# Patient Record
Sex: FEMALE | Race: WHITE | NOT HISPANIC OR LATINO | Employment: OTHER | URBAN - METROPOLITAN AREA
[De-identification: names, ages, dates, MRNs, and addresses within clinical notes are randomized per-mention and may not be internally consistent; named-entity substitution may affect disease eponyms.]

---

## 2017-09-20 ENCOUNTER — TELEPHONE (OUTPATIENT)
Dept: NEUROLOGY | Facility: CLINIC | Age: 57
End: 2017-09-20

## 2017-09-20 ENCOUNTER — TELEPHONE (OUTPATIENT)
Dept: SPINE | Facility: CLINIC | Age: 57
End: 2017-09-20

## 2017-09-20 ENCOUNTER — HOSPITAL ENCOUNTER (OUTPATIENT)
Dept: RADIOLOGY | Facility: HOSPITAL | Age: 57
Discharge: HOME | End: 2017-09-20
Attending: ORTHOPAEDIC SURGERY
Payer: MEDICARE

## 2017-09-20 ENCOUNTER — OFFICE VISIT (OUTPATIENT)
Dept: ORTHOPEDICS | Facility: CLINIC | Age: 57
End: 2017-09-20
Payer: MEDICARE

## 2017-09-20 DIAGNOSIS — M19.039 ARTHRITIS, WRIST: ICD-10-CM

## 2017-09-20 DIAGNOSIS — R20.2 NUMBNESS AND TINGLING: ICD-10-CM

## 2017-09-20 DIAGNOSIS — R20.2 NUMBNESS AND TINGLING: Primary | ICD-10-CM

## 2017-09-20 DIAGNOSIS — M54.9 BACK PAIN, UNSPECIFIED BACK LOCATION, UNSPECIFIED BACK PAIN LATERALITY, UNSPECIFIED CHRONICITY: Primary | ICD-10-CM

## 2017-09-20 DIAGNOSIS — M54.9 BACK PAIN, UNSPECIFIED BACK LOCATION, UNSPECIFIED BACK PAIN LATERALITY, UNSPECIFIED CHRONICITY: ICD-10-CM

## 2017-09-20 DIAGNOSIS — M54.2 NECK PAIN: ICD-10-CM

## 2017-09-20 DIAGNOSIS — R20.0 NUMBNESS AND TINGLING: Primary | ICD-10-CM

## 2017-09-20 DIAGNOSIS — R20.0 NUMBNESS AND TINGLING: ICD-10-CM

## 2017-09-20 PROCEDURE — 99203 OFFICE O/P NEW LOW 30 MIN: CPT | Mod: S$GLB,,, | Performed by: PHYSICIAN ASSISTANT

## 2017-09-20 PROCEDURE — 73110 X-RAY EXAM OF WRIST: CPT | Mod: TC,RT

## 2017-09-20 PROCEDURE — 73110 X-RAY EXAM OF WRIST: CPT | Mod: 26,RT,, | Performed by: RADIOLOGY

## 2017-09-20 PROCEDURE — 99999 PR PBB SHADOW E&M-EST. PATIENT-LVL III: CPT | Mod: PBBFAC,,, | Performed by: PHYSICIAN ASSISTANT

## 2017-09-20 NOTE — PROGRESS NOTES
"Subjective:      Patient ID: Alayna Anderson is a 56 y.o. female.    Chief Complaint: Neck Pain (Lower Back)      HPI     History of ETOH abuse and cocaine use, bipolar, COPD, and seizure disorder. She has not had a drink in 4 months and states she is not using any other drugs.     Had a fall in [facility] in August with pain in her neck and back since that time.     She complains of intermittent neck pain with right arm pain to her hand. No left arm pain. Neck pain < right arm pain. She is right hand dominant. No known alleviating or aggravating factors. Hard to use cane in right arm due to pain. She rates her pain as a 9 on a scale of 1-10. She has numbness, tingling, and weakness in her arm. Pain is sharp and stabbing.     She also has constant LBP with left posterior leg pain to her ankle. No right leg pain. LBP > left leg pain. No known aggravating or alleviating factors. No numbness or tingling in her legs. She has weakness in left leg. She rates her pain as a 9 on a scale of 1-10. Pain is sharp and shooting in nature.     No PT, injections, or surgery on her neck/back. She has taken some mobic with no relief.     Patient denies fevers, chills, night sweats, nausea, vomiting, and weight loss. Patient also denies bowel/bladder dysfunction, sexual dysfunction, and any saddle anesthesia. She denies balance issues, changes in handwriting, problems with hand dexterity, and frequent dropping of items. She admits to being "clammy."      Review of Systems   Eyes: Positive for blurred vision.        Positive for poor vision.    Respiratory: Negative for cough and shortness of breath.    Skin: Negative for rash.   Musculoskeletal: Positive for back pain and neck pain.   Gastrointestinal: Negative for bowel incontinence, constipation, diarrhea, nausea and vomiting.   Genitourinary: Negative for bladder incontinence.   Neurological: Positive for dizziness, numbness and paresthesias.   Psychiatric/Behavioral: The patient is not " nervous/anxious.            Objective:        General: Alayna is well-developed, well-nourished, appears stated age, in no acute distress, alert and oriented to time, place and person.     General    Vitals reviewed.  Constitutional: She is oriented to person, place, and time. She appears well-developed and well-nourished.   Pulmonary/Chest: Effort normal.   Abdominal: She exhibits no distension.   Neurological: She is alert and oriented to person, place, and time.   Psychiatric: She has a normal mood and affect. Her behavior is normal. Judgment and thought content normal.           Gait: she uses a cane to ambulate. Heel/toe/tandem walking not tested- she has poor vision and is fall risk. She sways with romberg.     On exam of the cervical spine, pt has diffuse posterior cervical tenderness.     Skin in cervical region is warm to the touch without visible rashes.     Patient has limited ROM of cervical spine in all planes due to pain.     Strength Testing of Bilateral UEs shows  Right :  +4/5   Left :  +5/5  Right deltoid:  +4/5   Left deltoid:  +5/5  Right biceps:  +4/5   Left biceps:  +5/5  Right triceps:  +4/5   Left triceps:  +5/5  Right wrist extension:  +4/5  Left wrist extension:  +5/5  Right wrist flexion:  +4/5  Left wrist flexion:  +5/5  Right interosseus:  +4/5  Left interosseus:  +5/5    Right UE weakness diffuse and appears to be pain mediated.     Sensation is grossly intact to light touch in C5, C6, C7, C8, T1 distribution.     Right shoulder has mild pain with IR/ER. Good ROM of shoulder and no tenderness.   Left shoulder has no pain with IR/ER. Good ROM of shoulder and no tenderness.     negative clonus in bilateral LEs.    negative hoffmans bilateral UEs.    DTRs:  Right biceps:  +2     Left biceps:  +2   Right brachioradialis:  +2  Left brachioradialis:  +2    On exam of the lumbar spine, Inspection of back is normal, diffuse lower lumbar tenderness.     Skin in the lumbar region is  warm to the touch without visible rashes.     muscle tone normal without spasm, limited range of motion with pain  Pain in flexion. and Pain in extension.    Strength testing of the bilateral LEs shows  Right hip abduction:  +5/5  Left hip abduction:  +5/5  Right hip flexion:  +5/5   Left hip flexion:  +5/5  Right hip extensors:  +5/5  Left hip extensors:  +5/5  Right quadriceps:  +5/5  Left quadriceps:  +5/5  Right hamstring:  +5/5  Left hamstring:  +5/5  Right dorsiflexion:  +5/5  Left dorsiflexion:  +5/5  Right plantar flexion:  +5/5  Left plantar flexion:  +5/5   Right EHL:  +5/5   Left EHL:  +5/5    negative straight leg raise on bilateral LEs.     DTRs:  Right patellar:  +2     Left patellar:  +2  Right achilles:  +2   Left achilles:  +2    Sensation is grossly intact in L2, L3, L4, L5, and S1 distribution.    Right hip has no pain with IR/ER. Left hip has no pain with IR/ER.        XRAY INTERPRETATION:  X-rays of cervical spine (AP/lat/flex/ext) dated 9/21/17 are personally reviewed and show mild DDD/spondylosis C4-T1 with slip C7-T1.    X-rays of lumbar spine (AP/lat/flex/ext) dated 9/21/17 are personally reviewed and show retrolisthesis L3-L4, mild diffuse DDD/spondylosis.         Assessment:       1. Cervical spondylosis without myelopathy    2. Degeneration of cervical intervertebral disc    3. Brachial neuritis    4. Neck pain    5. Acquired spondylolisthesis    6. Spondylosis without myelopathy    7. DDD (degenerative disc disease), lumbosacral    8. Thoracic and lumbosacral neuritis    9. Bilateral low back pain with left-sided sciatica, unspecified chronicity           Plan:       Orders Placed This Encounter    MRI Cervical Spine Without Contrast    gabapentin (NEURONTIN) 300 MG capsule       Had a fall in [employer] in August with pain in her neck and back since that time. She has intermittent neck pain with right arm pain to her hand. No left arm pain. Neck pain < right arm pain. Known mild  DDD/spondylosis C4-T1 with slip C7-T1. Also with constant LBP with left posterior leg pain to her ankle. No right leg pain. LBP > left leg pain. Known retrolisthesis L3-L4, mild diffuse DDD/spondylosis. History of ETOH abuse and cocaine use, bipolar, COPD, and seizure disorder. She has not had a drink in 4 months and states she is not using any other drugs. Treatment options reviewed with patient along with above cervical/lumbar XRs. Following plan made:     - MRI of cervical spine to further evaluate right UE radiculitis and weakness.   - Trial of neurontin to start 300mg q hs and increase to tid as tolerated. Reviewed dosing and side effects.   - Depending on results of MRI, consider cervical ESIs and/or PT for cervical/lumbar spine.   - She is not currently in litigation.     Follow-up: Return in 2 weeks (on 10/5/2017). If there are any questions prior to this, the patient was instructed to contact the office.

## 2017-09-20 NOTE — LETTER
September 22, 2017      Marlen Li MD  [address] [facility] [address]           [facility]  [address]  [facility] [facility] [address]  Phone: [phone]          Patient: Alayna Anderson   MR Number: [id number]   YOB: 1960   Date of Visit: 9/20/2017       Dear Dr. Marlen Li:    Thank you for referring Alayna Anderson to me for evaluation. Attached you will find relevant portions of my assessment and plan of care.    If you have questions, please do not hesitate to call me. I look forward to following Alayna Anderson along with you.    Sincerely,    Luh Yusuf PA-C    Enclosure  CC:  No Recipients    If you would like to receive this communication electronically, please contact [email or web address] or [phone] to request more information on [employer] Link access.    For providers and/or their staff who would like to refer a patient to [facility], please contact us through our one-stop-shop provider referral line, [facility] , at [phone].    If you feel you have received this communication in error or would no longer like to receive these types of communications, please e-mail [email or web address]

## 2017-09-21 ENCOUNTER — OFFICE VISIT (OUTPATIENT)
Dept: SPINE | Facility: CLINIC | Age: 57
End: 2017-09-21
Payer: MEDICARE

## 2017-09-21 ENCOUNTER — HOSPITAL ENCOUNTER (OUTPATIENT)
Dept: RADIOLOGY | Facility: OTHER | Age: 57
Discharge: HOME | End: 2017-09-21
Attending: PHYSICIAN ASSISTANT
Payer: MEDICARE

## 2017-09-21 VITALS
HEART RATE: 83 BPM | DIASTOLIC BLOOD PRESSURE: 58 MMHG | BODY MASS INDEX: 23.32 KG/M2 | SYSTOLIC BLOOD PRESSURE: 117 MMHG | HEIGHT: 65 IN | WEIGHT: 140 LBS

## 2017-09-21 DIAGNOSIS — M47.819 SPONDYLOSIS WITHOUT MYELOPATHY: ICD-10-CM

## 2017-09-21 DIAGNOSIS — M50.30 DEGENERATION OF CERVICAL INTERVERTEBRAL DISC: ICD-10-CM

## 2017-09-21 DIAGNOSIS — M51.37 DDD (DEGENERATIVE DISC DISEASE), LUMBOSACRAL: ICD-10-CM

## 2017-09-21 DIAGNOSIS — M54.17 THORACIC AND LUMBOSACRAL NEURITIS: ICD-10-CM

## 2017-09-21 DIAGNOSIS — M43.10 ACQUIRED SPONDYLOLISTHESIS: ICD-10-CM

## 2017-09-21 DIAGNOSIS — M54.42 BILATERAL LOW BACK PAIN WITH LEFT-SIDED SCIATICA, UNSPECIFIED CHRONICITY: ICD-10-CM

## 2017-09-21 DIAGNOSIS — M54.2 NECK PAIN: ICD-10-CM

## 2017-09-21 DIAGNOSIS — M54.12 BRACHIAL NEURITIS: ICD-10-CM

## 2017-09-21 DIAGNOSIS — M54.14 THORACIC AND LUMBOSACRAL NEURITIS: ICD-10-CM

## 2017-09-21 DIAGNOSIS — M47.812 CERVICAL SPONDYLOSIS WITHOUT MYELOPATHY: ICD-10-CM

## 2017-09-21 DIAGNOSIS — M54.9 BACK PAIN, UNSPECIFIED BACK LOCATION, UNSPECIFIED BACK PAIN LATERALITY, UNSPECIFIED CHRONICITY: ICD-10-CM

## 2017-09-21 PROCEDURE — 99999 PR PBB SHADOW E&M-EST. PATIENT-LVL III: CPT | Mod: PBBFAC,,, | Performed by: PHYSICIAN ASSISTANT

## 2017-09-21 PROCEDURE — 72120 X-RAY BEND ONLY L-S SPINE: CPT | Mod: 26,,, | Performed by: RADIOLOGY

## 2017-09-21 PROCEDURE — 72050 X-RAY EXAM NECK SPINE 4/5VWS: CPT | Mod: 26,,, | Performed by: RADIOLOGY

## 2017-09-21 PROCEDURE — 3008F BODY MASS INDEX DOCD: CPT | Mod: S$GLB,,, | Performed by: PHYSICIAN ASSISTANT

## 2017-09-21 PROCEDURE — 72050 X-RAY EXAM NECK SPINE 4/5VWS: CPT | Mod: TC

## 2017-09-21 PROCEDURE — 72100 X-RAY EXAM L-S SPINE 2/3 VWS: CPT | Mod: TC

## 2017-09-21 PROCEDURE — 99204 OFFICE O/P NEW MOD 45 MIN: CPT | Mod: S$GLB,,, | Performed by: PHYSICIAN ASSISTANT

## 2017-09-21 PROCEDURE — 72100 X-RAY EXAM L-S SPINE 2/3 VWS: CPT | Mod: 26,,, | Performed by: RADIOLOGY

## 2017-09-21 RX ORDER — GABAPENTIN 300 MG/1
CAPSULE ORAL
Qty: 90 CAPSULE | Refills: 2 | Status: SHIPPED | OUTPATIENT
Start: 2017-09-21 | End: 2017-11-27 | Stop reason: SDUPTHER

## 2017-09-21 NOTE — LETTER
September 22, 2017      Luh Yusuf PA-C  [address]  [facility] [address]           [facility]  [address], [address]  [facility] [address]  Phone: [phone]  Fax: [phone]          Patient: Alayna Anderson   MR Number: [id number]   YOB: 1960   Date of Visit: 9/21/2017       Dear Luh Yusuf:    Thank you for referring Alayna Anderson to me for evaluation. Attached you will find relevant portions of my assessment and plan of care.    If you have questions, please do not hesitate to call me. I look forward to following Alayna Anderson along with you.    Sincerely,    Alicia Arreguin PA-C    Enclosure  CC:  No Recipients    If you would like to receive this communication electronically, please contact [email or web address] or [phone] to request more information on [employer] Link access.    For providers and/or their staff who would like to refer a patient to [facility], please contact us through our one-stop-shop provider referral line, [facility], at [phone].    If you feel you have received this communication in error or would no longer like to receive these types of communications, please e-mail [email or web address]

## 2017-09-22 NOTE — PROGRESS NOTES
Subjective:      Patient ID: Alayna Anderson is a 56 y.o. female.    Chief Complaint: No chief complaint on file.    HPI    Patient is a 56 year old female who presents to clinic with  multiple complaints.  Today we will mainly focus on her right hand and wrist pain. Chapito stated that she has a history of  Prior wrist fracture a few years ago that she did not follow up for. She stated that she has constant pain in her wrist and fingers. She stated that she gets an intermittent clawing, numbness and tinlging as well as weakness. Patient does reports neck pain. She has taken Mobic without complete relief of symptoms.     Review of Systems   Constitution: Negative for chills and fever.   Cardiovascular: Negative for chest pain.   Respiratory: Negative for cough and shortness of breath.    Skin: Negative for color change, dry skin, itching, nail changes, poor wound healing and rash.   Musculoskeletal: Positive for arthritis, back pain, falls, joint pain, muscle weakness, myalgias, neck pain and stiffness.   Neurological: Negative for dizziness.   Psychiatric/Behavioral: Negative for altered mental status. The patient is not nervous/anxious.    All other systems reviewed and are negative.        Objective:            General    Constitutional: She is oriented to person, place, and time. She appears well-developed and well-nourished. No distress.   HENT:   Head: Atraumatic.   Eyes: Conjunctivae are normal.   Cardiovascular: Normal rate.    Pulmonary/Chest: Effort normal.   Neurological: She is alert and oriented to person, place, and time.   Psychiatric: She has a normal mood and affect. Her behavior is normal.             Right Hand/Wrist Exam     Range of Motion     Wrist   Extension: abnormal   Flexion: abnormal   Pronation: normal   Supination: normal     Tests   Phalens Sign: negative  Tinels Sign (Medial Nerve): negative  Finkelstein: negative        Right Elbow Exam     Tests Tinel's Sign (cubital tunnel):  negative          Muscle Strength   Right Upper Extremity   Wrist Extension: 3/5/5   Wrist Flexion: 3/5/5   : 3/5/5   Pinch Mechanism: 3/5    Vascular Exam       Capillary Refill  Right Hand: normal capillary refill            RADS: Deformity of the distal radius consistent with an old healed fracture.  Mild-to-moderate DJD.  No acute fracture or dislocation.  No bone destruction identified  Assessment:       Encounter Diagnoses   Name Primary?    Numbness and tingling Yes    Neck pain     Back pain, unspecified back location, unspecified back pain laterality, unspecified chronicity     Arthritis, wrist           Plan:       Discussed plan/ options with patient. Discussed that her symptoms may be mainly coming form her neck.   Order for EMG placed, pending EMG results she will follow up neurology or the hand clinic.   Wrist brace given. Will remove daily for range of motion.   Appointment made with back and spine.   She is to return to clinic as needed.

## 2017-09-26 ENCOUNTER — PROCEDURE VISIT (OUTPATIENT)
Dept: NEUROLOGY | Facility: CLINIC | Age: 57
End: 2017-09-26
Payer: MEDICARE

## 2017-09-26 DIAGNOSIS — R20.2 NUMBNESS AND TINGLING: ICD-10-CM

## 2017-09-26 DIAGNOSIS — M54.9 BACK PAIN, UNSPECIFIED BACK LOCATION, UNSPECIFIED BACK PAIN LATERALITY, UNSPECIFIED CHRONICITY: ICD-10-CM

## 2017-09-26 DIAGNOSIS — R20.0 NUMBNESS AND TINGLING: ICD-10-CM

## 2017-09-26 DIAGNOSIS — M54.2 NECK PAIN: ICD-10-CM

## 2017-09-26 PROCEDURE — 95886 MUSC TEST DONE W/N TEST COMP: CPT | Mod: S$GLB,,, | Performed by: PSYCHIATRY & NEUROLOGY

## 2017-09-26 PROCEDURE — 95912 NRV CNDJ TEST 11-12 STUDIES: CPT | Mod: S$GLB,,, | Performed by: PSYCHIATRY & NEUROLOGY

## 2017-10-04 DIAGNOSIS — M25.552 LEFT HIP PAIN: ICD-10-CM

## 2017-10-04 DIAGNOSIS — M25.531 RIGHT WRIST PAIN: Primary | ICD-10-CM

## 2017-10-04 DIAGNOSIS — M54.50 LUMBAGO: ICD-10-CM

## 2017-11-08 ENCOUNTER — HOSPITAL ENCOUNTER (OUTPATIENT)
Dept: RADIOLOGY | Facility: OTHER | Age: 57
Discharge: HOME | End: 2017-11-08
Attending: PHYSICIAN ASSISTANT
Payer: MEDICARE

## 2017-11-08 DIAGNOSIS — M50.30 DEGENERATION OF CERVICAL INTERVERTEBRAL DISC: ICD-10-CM

## 2017-11-08 DIAGNOSIS — M43.10 ACQUIRED SPONDYLOLISTHESIS: ICD-10-CM

## 2017-11-08 DIAGNOSIS — M54.2 NECK PAIN: ICD-10-CM

## 2017-11-08 DIAGNOSIS — M54.12 BRACHIAL NEURITIS: ICD-10-CM

## 2017-11-08 DIAGNOSIS — M47.812 CERVICAL SPONDYLOSIS WITHOUT MYELOPATHY: ICD-10-CM

## 2017-11-08 PROCEDURE — 72141 MRI NECK SPINE W/O DYE: CPT | Mod: TC

## 2017-11-08 PROCEDURE — 72141 MRI NECK SPINE W/O DYE: CPT | Mod: 26,,, | Performed by: RADIOLOGY

## 2017-11-15 ENCOUNTER — OFFICE VISIT (OUTPATIENT)
Dept: SPINE | Facility: CLINIC | Age: 57
End: 2017-11-15
Payer: MEDICARE

## 2017-11-15 VITALS
WEIGHT: 140 LBS | HEART RATE: 90 BPM | BODY MASS INDEX: 23.32 KG/M2 | HEIGHT: 65 IN | DIASTOLIC BLOOD PRESSURE: 65 MMHG | SYSTOLIC BLOOD PRESSURE: 98 MMHG

## 2017-11-15 DIAGNOSIS — M43.10 ACQUIRED SPONDYLOLISTHESIS: ICD-10-CM

## 2017-11-15 DIAGNOSIS — M54.2 NECK PAIN: ICD-10-CM

## 2017-11-15 DIAGNOSIS — G56.01 CARPAL TUNNEL SYNDROME, RIGHT: ICD-10-CM

## 2017-11-15 DIAGNOSIS — M54.14 THORACIC AND LUMBOSACRAL NEURITIS: ICD-10-CM

## 2017-11-15 DIAGNOSIS — M54.42 BILATERAL LOW BACK PAIN WITH LEFT-SIDED SCIATICA, UNSPECIFIED CHRONICITY: ICD-10-CM

## 2017-11-15 DIAGNOSIS — M47.812 CERVICAL SPONDYLOSIS WITHOUT MYELOPATHY: Primary | ICD-10-CM

## 2017-11-15 DIAGNOSIS — M54.17 THORACIC AND LUMBOSACRAL NEURITIS: ICD-10-CM

## 2017-11-15 DIAGNOSIS — M51.37 DDD (DEGENERATIVE DISC DISEASE), LUMBOSACRAL: ICD-10-CM

## 2017-11-15 DIAGNOSIS — M48.02 FORAMINAL STENOSIS OF CERVICAL REGION: ICD-10-CM

## 2017-11-15 DIAGNOSIS — M48.02 CERVICAL STENOSIS OF SPINAL CANAL: ICD-10-CM

## 2017-11-15 DIAGNOSIS — M47.819 SPONDYLOSIS WITHOUT MYELOPATHY: ICD-10-CM

## 2017-11-15 DIAGNOSIS — M54.12 BRACHIAL NEURITIS: ICD-10-CM

## 2017-11-15 DIAGNOSIS — M50.30 DEGENERATION OF CERVICAL INTERVERTEBRAL DISC: ICD-10-CM

## 2017-11-15 PROCEDURE — 99214 OFFICE O/P EST MOD 30 MIN: CPT | Mod: S$GLB,,, | Performed by: PHYSICIAN ASSISTANT

## 2017-11-15 PROCEDURE — 99999 PR PBB SHADOW E&M-EST. PATIENT-LVL IV: CPT | Mod: PBBFAC,,, | Performed by: PHYSICIAN ASSISTANT

## 2017-11-15 RX ORDER — IBUPROFEN 800 MG/1
800 TABLET ORAL
Qty: 90 TABLET | Refills: 2 | Status: SHIPPED | OUTPATIENT
Start: 2017-11-15 | End: 2018-04-16

## 2017-11-15 NOTE — PATIENT INSTRUCTIONS
You can try increasing neurontin to 600mg three times a day,     Take 300mg in am, 300mg at lunch, and 600mg at night for 3 days, then    Take 300mg in am, 600mg at lunch, and 600mg at night for 3 days, then    Take 600mg three times a day.       Let me know if you need me to call the 600mg pills to your pharmacy.

## 2017-11-15 NOTE — PROGRESS NOTES
Subjective:      Patient ID: Alayna Anderson is a 56 y.o. female.    Chief Complaint: Follow-up (After MRI)      HPI  (Kalyvas)    History of ETOH abuse and cocaine use, bipolar, COPD, and seizure disorder. She has not had a drink in 4 months and states she is not using any other drugs.     Had a fall in [place] in [place] with pain in her neck and back since that time.     Known mild DDD/spondylosis C4-T1 with slip C7-T1 along with retrolisthesis L3-L4, mild diffuse DDD/spondylosis.     Here today to review her cervical MRI scan. Started on neurontin at her last visit. Also had recent EMG by Dr. Rahman of both UEs on 9/26/17 showing right CTS. She continues to complain of intermittent neck pain with right arm pain to her hand. She now has intermittent left arm pain into hand as well. Neck pain < right arm pain. No known alleviating or aggravating factors. Hard to use cane in right arm due to pain. She rates her pain as a 9 on a scale of 1-10. She has numbness, tingling, and weakness in her right arm. Pain is sharp and stabbing. She also continues with constant LBP with left posterior leg pain to her ankle. No right leg pain. LBP > left leg pain. No known aggravating or alleviating factors. No numbness or tingling in her legs. She has weakness in left leg. Pain is sharp and shooting in nature. She has noted some initial improvement with neurontin, but thinks she may need to go up on the dose.       Review of Systems   Constitution: Negative for chills, fever, night sweats and weight gain.   Gastrointestinal: Negative for bowel incontinence, nausea and vomiting.   Genitourinary: Negative for bladder incontinence.   Neurological: Negative for disturbances in coordination and loss of balance.           Objective:        General: Alayna is well-developed, well-nourished, appears stated age, in no acute distress, alert and oriented to time, place and person.     Ortho/SPM Exam     Patient sits comfortably in the exam  room and answers questions appropriately. Grossly patient is able to move bilateral UEs/LEs without difficulty. Ambulates with cane.       XRAY INTERPRETATION:  MRI of cervical spine dated 11/8/17 is personally reviewed and shows diffuse cervical spondylosis with mild right foraminal stenosis C3-C4. Moderate left foraminal stenosis C4-C5. Left paracentral disc C5-C6 with mild central stenosis and mild right/moderate left foraminal stenosis. Moderate left/mild right foraminal stenosis C6-C7. Mild central stenosis C7-T1 with slip and moderate bilateral foraminal stenosis.         Assessment:       1. Cervical spondylosis without myelopathy    2. Degeneration of cervical intervertebral disc    3. Brachial neuritis    4. Neck pain    5. Acquired spondylolisthesis    6. Spondylosis without myelopathy    7. DDD (degenerative disc disease), lumbosacral    8. Thoracic and lumbosacral neuritis    9. Bilateral low back pain with left-sided sciatica, unspecified chronicity    10. Foraminal stenosis of cervical region    11. Carpal tunnel syndrome, right    12. Cervical stenosis of spinal canal           Plan:       Orders Placed This Encounter    Ambulatory referral to Physical Therapy - Cervical    ibuprofen (ADVIL,MOTRIN) 800 MG tablet       Had a fall in [employer] in August with pain in her neck and back since that time. She has intermittent neck pain with right arm pain to her hand. Now with intermittent left arm pain as well. Neck pain < right arm pain. Known mild DDD/spondylosis C4-T1 with slip C7-T1 along with multilevel foraminal stenosis and mild central stenosis C5-C6/C7-T1. Recent EMG showed right CTS with no cervical radiculopathy. Also with constant LBP with left posterior leg pain to her ankle. No right leg pain. LBP > left leg pain. Known retrolisthesis L3-L4, mild diffuse DDD/spondylosis. History of ETOH abuse and cocaine use, bipolar, COPD, and seizure disorder. Treatment options reviewed with patient along with  above cervical MRI. Following plan made:     - Increase neurontin slowly to 600mg tid. Given instructions. Reviewed side effects.   - New prescription for motrin 800mg. Take with food.   - PT for both cervical and lumbar spine with good HEP. Internal orders sent to [facility] [address].   - Agree with f/u with ortho hand for right CTS.   - If neck/arm pain persist, consider C7-T1 IL HOSEA with pain management. If back/leg pain get worse, consider lumbar MRI.   - She is not currently in litigation.     Follow-up: Return in 3 months (on 2/15/2018). If there are any questions prior to this, the patient was instructed to contact the office.

## 2017-11-20 ENCOUNTER — TELEPHONE (OUTPATIENT)
Dept: SPINE | Facility: CLINIC | Age: 57
End: 2017-11-20

## 2017-11-20 NOTE — TELEPHONE ENCOUNTER
----- Message from Keila Houston sent at 11/20/2017  2:56 PM CST -----  Contact: Pt  Pt is calling to speak with nurse in regards to pain level in both hands, neck and spine.  Pt can be reached at [phone] to discuss.    Thank you

## 2017-11-27 ENCOUNTER — TELEPHONE (OUTPATIENT)
Dept: SPINE | Facility: CLINIC | Age: 57
End: 2017-11-27

## 2017-11-27 DIAGNOSIS — M54.17 THORACIC AND LUMBOSACRAL NEURITIS: ICD-10-CM

## 2017-11-27 DIAGNOSIS — M54.2 NECK PAIN: ICD-10-CM

## 2017-11-27 DIAGNOSIS — M47.812 CERVICAL SPONDYLOSIS WITHOUT MYELOPATHY: ICD-10-CM

## 2017-11-27 DIAGNOSIS — M50.30 DEGENERATION OF CERVICAL INTERVERTEBRAL DISC: ICD-10-CM

## 2017-11-27 DIAGNOSIS — M43.10 ACQUIRED SPONDYLOLISTHESIS: ICD-10-CM

## 2017-11-27 DIAGNOSIS — M47.819 SPONDYLOSIS WITHOUT MYELOPATHY: ICD-10-CM

## 2017-11-27 DIAGNOSIS — M54.14 THORACIC AND LUMBOSACRAL NEURITIS: ICD-10-CM

## 2017-11-27 DIAGNOSIS — M54.12 BRACHIAL NEURITIS: ICD-10-CM

## 2017-11-27 DIAGNOSIS — M51.37 DDD (DEGENERATIVE DISC DISEASE), LUMBOSACRAL: ICD-10-CM

## 2017-11-27 DIAGNOSIS — M54.42 BILATERAL LOW BACK PAIN WITH LEFT-SIDED SCIATICA, UNSPECIFIED CHRONICITY: ICD-10-CM

## 2017-11-27 NOTE — TELEPHONE ENCOUNTER
Called patient no answer left message to give office a call.  ----- Message from Alicia Arreguin PA-C sent at 11/27/2017  2:51 PM CST -----  We talked about her increasing her neurontin slowly to 600mg tid. I got a refill request for her neurontin today. Please call and find out exact dose she is taking and how often so I can fill it correctly.     Thanks.

## 2017-11-27 NOTE — TELEPHONE ENCOUNTER
----- Message from Pennie Rico sent at 11/27/2017  3:51 PM CST -----  Contact: pt   x 1st Request  _ 2nd Request  _ 3rd Request    Who: pt     Why: Pt states she is returning Sallie call. Pt states she is taking neurontin 300 mg, 3 in the morning, 3 in the afternoon and 3 at night. Please call and discuss.     What Number to Call Back: [phone]     When to Expect a call back: (Before the end of the day)  -- if call after 3:00 call back will be tomorrow.

## 2017-11-27 NOTE — TELEPHONE ENCOUNTER
Called patient no answer left message on voicemail to return office call.  ----- Message from Shu Camacho sent at 11/27/2017  1:05 PM CST -----  Contact: pt  x_  1st Request  _  2nd Request  _  3rd Request    Please refill the medication listed below. Please call the patient when the prescription is ready for  at this phone number:  [phone]     Medication #1-gabapentin (NEURONTIN) 300 MG capsule      Preferred Pharmacy:[facility] [address] [place] [id number]

## 2017-11-27 NOTE — TELEPHONE ENCOUNTER
Patient calling for RX refill of Gabapentin.  ----- Message from Ephraim Borden sent at 11/27/2017  1:29 PM CST -----  _  1st Request  _X  2nd Request  _  3rd Request        Who: DEE PORTILLO [[id number]]    Why: Pt is returning a call from Kristyn. Please call back.    What Number to Call Back:[phone]    When to Expect a call back: (Within 24 hours)    Please return the call at earliest convenience. Thanks!

## 2017-11-28 RX ORDER — GABAPENTIN 300 MG/1
CAPSULE ORAL
Qty: 270 CAPSULE | Refills: 2 | Status: SHIPPED | OUTPATIENT
Start: 2017-11-28 | End: 2018-02-28 | Stop reason: SDUPTHER

## 2017-12-01 ENCOUNTER — OFFICE VISIT (OUTPATIENT)
Dept: ORTHOPEDICS | Facility: CLINIC | Age: 57
End: 2017-12-01
Payer: MEDICARE

## 2017-12-01 ENCOUNTER — TELEPHONE (OUTPATIENT)
Dept: ORTHOPEDICS | Facility: CLINIC | Age: 57
End: 2017-12-01

## 2017-12-01 VITALS
HEART RATE: 96 BPM | HEIGHT: 65 IN | SYSTOLIC BLOOD PRESSURE: 160 MMHG | WEIGHT: 140 LBS | DIASTOLIC BLOOD PRESSURE: 82 MMHG | BODY MASS INDEX: 23.32 KG/M2

## 2017-12-01 DIAGNOSIS — M19.049 HAND ARTHRITIS: ICD-10-CM

## 2017-12-01 DIAGNOSIS — M65.4 DE QUERVAIN'S TENOSYNOVITIS, RIGHT: ICD-10-CM

## 2017-12-01 DIAGNOSIS — G56.01 CARPAL TUNNEL SYNDROME ON RIGHT: Primary | ICD-10-CM

## 2017-12-01 DIAGNOSIS — M19.039 WRIST ARTHRITIS: ICD-10-CM

## 2017-12-01 DIAGNOSIS — F17.200 SMOKER: ICD-10-CM

## 2017-12-01 PROCEDURE — 99204 OFFICE O/P NEW MOD 45 MIN: CPT | Mod: 25,S$GLB,, | Performed by: PLASTIC SURGERY

## 2017-12-01 PROCEDURE — 99999 PR PBB SHADOW E&M-EST. PATIENT-LVL III: CPT | Mod: PBBFAC,,, | Performed by: PLASTIC SURGERY

## 2017-12-01 PROCEDURE — 20550 NJX 1 TENDON SHEATH/LIGAMENT: CPT | Mod: 59,51,RT,S$GLB | Performed by: PHYSICIAN ASSISTANT

## 2017-12-01 PROCEDURE — 20605 DRAIN/INJ JOINT/BURSA W/O US: CPT | Mod: 59,51,RT,S$GLB | Performed by: PHYSICIAN ASSISTANT

## 2017-12-01 PROCEDURE — 20526 THER INJECTION CARP TUNNEL: CPT | Mod: 59,RT,S$GLB, | Performed by: PHYSICIAN ASSISTANT

## 2017-12-01 RX ORDER — TRIAMCINOLONE ACETONIDE 40 MG/ML
80 INJECTION, SUSPENSION INTRA-ARTICULAR; INTRAMUSCULAR
Status: COMPLETED | OUTPATIENT
Start: 2017-12-01 | End: 2017-12-01

## 2017-12-01 RX ORDER — LIDOCAINE HYDROCHLORIDE 10 MG/ML
2 INJECTION, SOLUTION EPIDURAL; INFILTRATION; INTRACAUDAL; PERINEURAL
Status: COMPLETED | OUTPATIENT
Start: 2017-12-01 | End: 2017-12-01

## 2017-12-01 RX ADMIN — LIDOCAINE HYDROCHLORIDE 20 MG: 10 INJECTION, SOLUTION EPIDURAL; INFILTRATION; INTRACAUDAL; PERINEURAL at 04:12

## 2017-12-01 RX ADMIN — TRIAMCINOLONE ACETONIDE 80 MG: 40 INJECTION, SUSPENSION INTRA-ARTICULAR; INTRAMUSCULAR at 04:12

## 2017-12-01 NOTE — LETTER
December 1, 2017      uLh Yusuf PA-C  [address]  [facility] [address]           [facility]  [address], [address]  [facility] [address]  Phone: [phone]          Patient: Alayna Anderson   MR Number: [id number]   YOB: 1960   Date of Visit: 12/1/2017       Dear Luh Yusuf:    Thank you for referring Alayna Anderson to me for evaluation. Attached you will find relevant portions of my assessment and plan of care.    If you have questions, please do not hesitate to call me. I look forward to following Alayna Anderson along with you.    Sincerely,    Fransisco Jin Jr., MD    Enclosure  CC:  No Recipients    If you would like to receive this communication electronically, please contact [email or web address] or [phone] to request more information on [employer] Link access.    For providers and/or their staff who would like to refer a patient to [facility], please contact us through our one-stop-shop provider referral line, [facility] , at [phone].    If you feel you have received this communication in error or would no longer like to receive these types of communications, please e-mail [email or web address]

## 2017-12-01 NOTE — TELEPHONE ENCOUNTER
----- Message from Pravin Zuluaga sent at 12/1/2017  2:21 PM CST -----  Contact: Pt  _x  1st Request  _  2nd Request  _  3rd Request        Who: DEE PORTILLO [[id number]]    Why: Requesting a call back in regards to discussing appt that is scheduled for today @ 230. Pt states she has to walk and is running late. Would like to discuss if she will be able to continue with appt.  What Number to Call Back:[phone]    When to Expect a call back: (Within 24 hours)    Please return the call at earliest convenience. Thanks!

## 2017-12-01 NOTE — PROGRESS NOTES
Subjective:      Patient ID: Alayna Anderson is a 57 y.o. female.    Chief Complaint: Pain of the Right Wrist      HPI  Alayna Anderson is a right hand dominant 57 y.o. female with hx right wrist fracture and cervical stenosis presenting today for right hand pain and numbness. She notes constant numbness of the right hand, all fingers. Also has shooting pains of the hands which radiate up the forearm. She has difficulty using her cane due to pain. Has intermittent swelling of the right wrist. She does have a night time brace she is unsure how much this helps.       Review of patient's allergies indicates:   Allergen Reactions    Opioids - morphine analogues          Current Outpatient Prescriptions   Medication Sig Dispense Refill    gabapentin (NEURONTIN) 300 MG capsule 3 po (900mg) tid 270 capsule 2    ibuprofen (ADVIL,MOTRIN) 800 MG tablet Take 1 tablet (800 mg total) by mouth 3 (three) times daily with meals. 90 tablet 2    divalproex (DEPAKOTE) 250 MG EC tablet Take 1 tablet (250 mg total) by mouth as directed. Take 1 pill (250 mg) twice a day for two days,  Then begin 1 pill (250 mg) every morning, and 2 pills (500 mg) every morning. 90 tablet 6    levetiracetam (KEPPRA) 500 MG Tab Take 1 tablet (500 mg total) by mouth 2 (two) times daily. 60 tablet 11    sertraline (ZOLOFT) 50 MG tablet Take 1 tablet (50 mg total) by mouth once daily. Take one half tablet (25 mg) daily for two days, then change to one tablet (50 mg) daily. 30 tablet 11     No current facility-administered medications for this visit.        Past Medical History:   Diagnosis Date    Alcohol abuse     Arthritis     Bipolar disorder     COPD (chronic obstructive pulmonary disease)     Disc degeneration     Diverticulitis     Homelessness     Seizures     Tobacco abuse        Past Surgical History:   Procedure Laterality Date    TUBAL LIGATION         Review of Systems:  Constitutional: Negative for chills and fever.   Respiratory:  "Negative for cough and shortness of breath.    Gastrointestinal: Negative for nausea and vomiting.   Skin: Negative for rash.   Neurological: Negative for dizziness and headaches.   Psychiatric/Behavioral: Negative for depression.   MSK as in HPI       OBJECTIVE:     PHYSICAL EXAM:  BP (!) 160/82 (BP Location: Left arm, Patient Position: Sitting, BP Method: Small (Automatic))   Pulse 96   Ht 5' 5" (1.651 m)   Wt 63.5 kg (140 lb)   BMI 23.30 kg/m²     GEN:  NAD, well-developed, well-groomed.  NEURO: Awake, alert, and oriented. Normal attention and concentration.    PSYCH: Normal mood and affect. Behavior is normal.  HEENT: No cervical lymphadenopathy noted.  CARDIOVASCULAR: Radial pulses 2+ bilaterally. No LE edema noted.  PULMONARY: Breath sounds normal. No respiratory distress.  SKIN: Intact, no rashes.      MSK:   RUE:  Good active ROM of the wrist and fingers. She has decreased sensation in the radial, ulnar, and median nerve distributions in the right hand as compared to the left. She is tender to palpation along the joint line of the wrist. She is tender to palpation of the first dorsal compartment. Positive finkelstein. Positive tinels at the wrist, pain with carpal tunnel compression test.       RADIOGRAPHS:  Xray right wrist 9/20/17  Narrative   3 views    Deformity of the distal radius consistent with an old healed fracture.  Mild-to-moderate DJD.  No acute fracture or dislocation.  No bone destruction identified     Comments: I have personally reviewed the imaging and I agree with the above radiologist's report.    EMG 9/26/17  Impression   This study is abnormal. There is electrophysiologic evidence for a right median neuropathy at the wrist (carpal tunnel syndrome).     ASSESSMENT/PLAN:       ICD-10-CM ICD-9-CM   1. Carpal tunnel syndrome on right G56.01 354.0   2. De Quervain's tenosynovitis, right M65.4 727.04   3. Hand arthritis M19.049 716.94   4. Wrist arthritis M19.039 716.93   5. Smoker " F17.200 305.1       Orders Placed This Encounter    Ambulatory referral to Smoking Cessation Program     Orders Placed This Encounter   Procedures    Ambulatory referral to Smoking Cessation Program        Plan:   -discussed treatment options with patient. She has decided to proceed with injections of the right carpal tunnel, right first dorsal compartment, and right radiocarpal joint.   -smoking cessation program referral placed per pt request  -RTC 6 wks       PROCEDURE:  I have explained the risks, benefits, and alternatives of the procedure in detail.  The patient voices understanding and all questions have been answered.  The patient agrees to proceed as planned. So after a sterile prep of the skin in the normal fashion the right carpal tunnel is injected from the volar approach using a 25 gauge needle with a combination of 0.5 cc 1% plain lidocaine and 20 mg of kenalog.  The patient is cautioned and immediate relief of pain is secondary to the local anesthetic and will be temporary.  After the anesthetic wears off there may be a increase in pain that may last for a few hours or a few days and they should use ice to help alleviate this flair up of pain. Patient tolerated the procedure well.     PROCEDURE:  I have explained the risks, benefits, and alternatives of the procedure in detail.  The patient voices understanding and all questions have been answered.  The patient agrees to proceed as planned. So after a sterile prep of the skin in the normal fashion the right radiocarpal joint is injected from the dorsal approach using a 25 gauge needle with a combination of 1cc 1% plain lidocaine and 40 mg of kenalog.  The patient is cautioned and immediate relief of pain is secondary to the local anesthetic and will be temporary.  After the anesthetic wears off there may be a increase in pain that may last for a few hours or a few days and they should use ice to help alleviate this flair up of pain. Patient  tolerated the procedure well.     PROCEDURE:  I have explained the risks, benefits, and alternatives of the procedure in detail.  The patient voices understanding and all questions have been answered.  The patient agrees to proceed as planned. So after a sterile prep of the skin in the normal fashion the right first dorsal compartment is injected using a 25 gauge needle with a combination of 0.5 cc 1% plain lidocaine and 20 mg of kenalog.  The patient is cautioned and immediate relief of pain is secondary to the local anesthetic and will be temporary.  After the anesthetic wears off there may be a increase in pain that may last for a few hours or a few days and they should use ice to help alleviate this flair up of pain. Patient tolerated the procedure well.     The patient indicates understanding of these issues and agrees to the plan.    Mary Kay Tran PA-C  [facility] [place], [place]

## 2018-01-25 ENCOUNTER — TELEPHONE (OUTPATIENT)
Dept: ORTHOPEDICS | Facility: CLINIC | Age: 58
End: 2018-01-25

## 2018-01-25 NOTE — TELEPHONE ENCOUNTER
Phone call to pt - states that appt is a follow up for rt wrist from December, 2017, but left wrist is beginning w/similar sx intermittently. [facility] I will hold Xray for now until she sees  tomorrow

## 2018-01-26 ENCOUNTER — OFFICE VISIT (OUTPATIENT)
Dept: ORTHOPEDICS | Facility: CLINIC | Age: 58
End: 2018-01-26
Payer: MEDICARE

## 2018-01-26 VITALS
HEIGHT: 65 IN | SYSTOLIC BLOOD PRESSURE: 131 MMHG | DIASTOLIC BLOOD PRESSURE: 83 MMHG | HEART RATE: 82 BPM | BODY MASS INDEX: 23.32 KG/M2 | WEIGHT: 140 LBS

## 2018-01-26 DIAGNOSIS — G56.02 LEFT CARPAL TUNNEL SYNDROME: Primary | ICD-10-CM

## 2018-01-26 PROCEDURE — 99213 OFFICE O/P EST LOW 20 MIN: CPT | Mod: 25,S$GLB,, | Performed by: PLASTIC SURGERY

## 2018-01-26 PROCEDURE — 20526 THER INJECTION CARP TUNNEL: CPT | Mod: LT,S$GLB,, | Performed by: PLASTIC SURGERY

## 2018-01-26 PROCEDURE — 99999 PR PBB SHADOW E&M-EST. PATIENT-LVL II: CPT | Mod: PBBFAC,,, | Performed by: PLASTIC SURGERY

## 2018-01-26 RX ORDER — TRIAMCINOLONE ACETONIDE 40 MG/ML
40 INJECTION, SUSPENSION INTRA-ARTICULAR; INTRAMUSCULAR
Status: COMPLETED | OUTPATIENT
Start: 2018-01-26 | End: 2018-01-26

## 2018-01-26 RX ORDER — LIDOCAINE HYDROCHLORIDE 10 MG/ML
1 INJECTION INFILTRATION; PERINEURAL
Status: COMPLETED | OUTPATIENT
Start: 2018-01-26 | End: 2018-01-26

## 2018-01-26 RX ADMIN — TRIAMCINOLONE ACETONIDE 40 MG: 40 INJECTION, SUSPENSION INTRA-ARTICULAR; INTRAMUSCULAR at 04:01

## 2018-01-26 RX ADMIN — LIDOCAINE HYDROCHLORIDE 1 ML: 10 INJECTION INFILTRATION; PERINEURAL at 04:01

## 2018-01-26 NOTE — LETTER
January 26, 2018      Luh Yusuf PA-C  [address]  [facility] [address]           [facility]  [address], [address]  [facility] [address]  Phone: [phone]          Patient: Alayna Anderson   MR Number: [id number]   YOB: 1960   Date of Visit: 1/26/2018       Dear Luh Yusuf:    Thank you for referring Alayna Anderson to me for evaluation. Attached you will find relevant portions of my assessment and plan of care.    If you have questions, please do not hesitate to call me. I look forward to following Alayna Anderson along with you.    Sincerely,    Fransisco Jin Jr., MD    Enclosure  CC:  No Recipients    If you would like to receive this communication electronically, please contact [email or web address] or [phone] to request more information on [employer] access.    For providers and/or their staff who would like to refer a patient to [facility], please contact us through our one-stop-shop provider referral line, [facility] , at [phone].    If you feel you have received this communication in error or would no longer like to receive these types of communications, please e-mail [email or web address]

## 2018-01-26 NOTE — PROGRESS NOTES
"HISTORY OF PRESENT ILLNESS:  Ms. Anderson returns today four weeks after the   injection of her right carpal tunnel, right de Quervain's and right radiocarpal   joint.  She states that she is doing well.  She states that her symptoms that   she was complaining about four weeks ago have subsided.  She occasionally has   some numbness and tingling and overall she is pleased.  She began to experience   numbness and tingling within the median distribution of the left hand.  She   denies any recent history of trauma and no other complaints today.    PHYSICAL EXAMINATION:  VITAL SIGNS: /83 (BP Location: Right arm, Patient Position: Sitting, BP Method: Medium (Automatic))   Pulse 82   Ht 5' 5" (1.651 m)   Wt 63.5 kg (140 lb)   BMI 23.30 kg/m²     GENERAL:  No acute distress, alert and oriented x3, cooperative, well nourished.  LUNGS:  Nonlabored on room air.  LEFT UPPER EXTREMITY:  Positive Tinel's over the carpal tunnel.  She has full   active range of motion of all digits at all joints.  She is neurovascularly   intact in the median, radial and ulnar distributions.    PROCEDURE:  I have explained the risks, benefits, and alternatives of the procedure in detail.  The patient voices understanding and all questions have been answered.  The patient agrees to proceed as planned. After a sterile prep of the skin the left carpal tunnel is injected from the volar approach using a 25 gauge needle with a combination of 1cc 1% plain xylocaine and 40 mg of Kenalog.  The patient is cautioned and immediate relief of pain is secondary to the local anesthetic and will be temporary.  After the anesthetic wears off there may be a increase in pain that may last for a few hours or a few days and they should use ice to help alleviate this flair up of pain.       ASSESSMENT:  Left carpal tunnel syndrome.    PLAN:  I discussed that she appears to have carpal tunnel symptoms in left hand,   which appear to be milder than the right.  Due " to the fact she is presenting   with symptoms today, I discussed performing an injection into the carpal tunnel   on the left.  She wished to receive with this.  Please see procedure note above.    She was instructed to follow with me as needed if her symptoms fail to improve   or worsen.  All questions and concerns were addressed prior to discharge.      KOBE/ELZBIETA  dd: 01/26/2018 16:33:57 (CST)  td: 01/27/2018 12:22:06 (CST)  Doc ID   #[id number]  Job ID #[id number]    CC:       Dictation Confirmation Code: [id number]  Fransisco Jin Jr. MD  01/26/2018  4:29 PM

## 2018-02-01 ENCOUNTER — TELEPHONE (OUTPATIENT)
Dept: SPINE | Facility: CLINIC | Age: 58
End: 2018-02-01

## 2018-02-01 DIAGNOSIS — M47.812 CERVICAL SPONDYLOSIS WITHOUT MYELOPATHY: ICD-10-CM

## 2018-02-01 DIAGNOSIS — M47.819 SPONDYLOSIS WITHOUT MYELOPATHY: ICD-10-CM

## 2018-02-01 DIAGNOSIS — M50.30 DEGENERATION OF CERVICAL INTERVERTEBRAL DISC: ICD-10-CM

## 2018-02-01 DIAGNOSIS — M43.10 ACQUIRED SPONDYLOLISTHESIS: ICD-10-CM

## 2018-02-01 DIAGNOSIS — M51.37 DDD (DEGENERATIVE DISC DISEASE), LUMBOSACRAL: ICD-10-CM

## 2018-02-01 DIAGNOSIS — M54.42 BILATERAL LOW BACK PAIN WITH LEFT-SIDED SCIATICA, UNSPECIFIED CHRONICITY: ICD-10-CM

## 2018-02-01 DIAGNOSIS — M48.02 CERVICAL STENOSIS OF SPINAL CANAL: ICD-10-CM

## 2018-02-01 DIAGNOSIS — M54.2 NECK PAIN: Primary | ICD-10-CM

## 2018-02-01 DIAGNOSIS — M48.02 FORAMINAL STENOSIS OF CERVICAL REGION: ICD-10-CM

## 2018-02-01 DIAGNOSIS — M54.12 BRACHIAL NEURITIS: ICD-10-CM

## 2018-02-01 RX ORDER — METHYLPREDNISOLONE 4 MG/1
TABLET ORAL
Qty: 1 PACKAGE | Refills: 0 | Status: SHIPPED | OUTPATIENT
Start: 2018-02-01 | End: 2018-02-15 | Stop reason: ALTCHOICE

## 2018-02-02 NOTE — TELEPHONE ENCOUNTER
Patient having neck pain 10/10 she stated it goes behind her neck down low back please advise   ----- Message from Pennie Rico sent at 2/1/2018  3:02 PM CST -----  Contact: pt   x 1st Request  _ 2nd Request  _ 3rd Request    Who:pt     Why:pt is requesting a call back in regards to her back pain she is having.     What Number to Call Back:[phone]     When to Expect a call back: (Before the end of the day)  -- if call after 3:00 call back will be tomorrow.         
Recommend a medrol dose pack. I have sent this to her pharmacy. She should stop motrin while she is taking this. Please make her a f/u with me in next 2 weeks as well.   
not applicable (Male)

## 2018-02-05 DIAGNOSIS — J18.9 PNEUMONIA, UNSPECIFIED ORGANISM: Primary | ICD-10-CM

## 2018-02-14 NOTE — PROGRESS NOTES
Subjective:      Patient ID: Alayna Anderson is a 57 y.o. female.    Chief Complaint: Neck Pain      HPI  (Kalyvas)    Known mild DDD/spondylosis C4-T1 with slip C7-T1 along with multilevel foraminal stenosis and mild central stenosis C5-C6/C7-T1. Recent EMG showed right CTS with no cervical radiculopathy. Also with known retrolisthesis L3-L4, mild diffuse DDD/spondylosis. History of ETOH abuse and cocaine use, bipolar, COPD, and seizure disorder. States she currently is not drinking and that last drug use was years ago.     Sent to PT at last visit, increased her neurontin, and started her on motrin. Called on 2/1/18 with severe neck pain and medrol dose pack was called in. This helped her pain significantly.     She has been sick over last month with frequent coughing. This has aggravated her neck and back pain. She also notes pain in left side of her abdomen with muscle spasms. She has intermittent neck and LBP that is worse with coughing. She has muscle spasms. She rates her pain as a 9 on a scale of 1-10. No significant arm or leg pain. She continues on neurontin and prn motrin. She was given norco by ED. She has not yet made it to PT. She needs a new prescription.       Review of Systems   Constitution: Negative for chills, fever, night sweats and weight gain.   Gastrointestinal: Negative for bowel incontinence, nausea and vomiting.   Genitourinary: Negative for bladder incontinence.   Neurological: Negative for disturbances in coordination and loss of balance.           Objective:        General: Alayna is well-developed, well-nourished, appears stated age, in no acute distress, alert and oriented to time, place and person.     Ortho/SPM Exam     Patient sits comfortably in the exam room and answers questions appropriately. Grossly patient is able to move bilateral UEs/LEs without difficulty. Ambulates with cane.     She has diffuse posterior cervical and central lower lumbar tenderness.     Strength  Testing of Bilateral UEs shows  Right :  +5/5   Left :  +5/5  Right deltoid:  +5/5   Left deltoid:  +5/5  Right biceps:  +5/5   Left biceps:  +5/5  Right triceps:  +5/5   Left triceps:  +5/5  Right wrist extension:  +5/5  Left wrist extension:  +5/5  Right wrist flexion:  +5/5  Left wrist flexion:  +5/5  Right interosseus:  +5/5  Left interosseus:  +5/5    Strength testing of the bilateral LEs shows  Right hip abduction:  +5/5  Left hip abduction:  +5/5  Right hip flexion:  +5/5   Left hip flexion:  +5/5  Right hip extensors:  +5/5  Left hip extensors:  +5/5  Right quadriceps:  +5/5  Left quadriceps:  +5/5  Right hamstring:  +5/5  Left hamstring:  +5/5  Right dorsiflexion:   +5/5  Left dorsiflexion:  +5/5  Right plantar flexion:  +5/5  Left plantar flexion:  +5/5   Right EHL:  +5/5   Left EHL:  +5/5    Sensation is grossly intact to light touch in bilateral UEs/LEs.         Assessment:       1. Neck pain    2. Cervical spondylosis without myelopathy    3. Degeneration of cervical intervertebral disc    4. Acquired spondylolisthesis    5. Spondylosis without myelopathy    6. DDD (degenerative disc disease), lumbosacral    7. Foraminal stenosis of cervical region    8. Cervical stenosis of spinal canal    9. Bilateral low back pain without sciatica, unspecified chronicity           Plan:       Orders Placed This Encounter    Ambulatory consult to [facility]    cyclobenzaprine (FLEXERIL) 10 MG tablet       She has been sick over last month with frequent coughing that has aggravated her neck/back pain. Now with intermittent neck and LBP that is worse with coughing. No significant arm or leg pain. Known mild DDD/spondylosis C4-T1 with slip C7-T1 along with multilevel foraminal stenosis and mild central stenosis C5-C6/C7-T1. Recent EMG showed right CTS with no cervical radiculopathy. Also with known retrolisthesis L3-L4, mild diffuse DDD/spondylosis. History of ETOH abuse and cocaine use, bipolar,  COPD, and seizure disorder. Current pain appears more myofascial in nature and is likely aggravated with frequent coughing. Treatment options reviewed with patient and following plan made:     - New prescription for flexeril. Reviewed dosing and side effects. May only be able to take at night.   - Referral to [facility] program for cervical and then lumbar spine. Walked over there today to schedule.   - Continue with neurontin and motrin. No refills needed.   - Continue to follow with ortho hand for right CTS.   - If neck/arm pain persist, consider C7-T1 IL HOSEA with pain management. If back/leg pain get worse, consider lumbar MRI.   - She is not currently in litigation- initial pain started after a fall at [facility].     Follow-up: Follow-up in 3 months (on 5/15/2018). If there are any questions prior to this, the patient was instructed to contact the office.

## 2018-02-15 ENCOUNTER — OFFICE VISIT (OUTPATIENT)
Dept: SPINE | Facility: CLINIC | Age: 58
End: 2018-02-15
Payer: MEDICARE

## 2018-02-15 VITALS
SYSTOLIC BLOOD PRESSURE: 148 MMHG | DIASTOLIC BLOOD PRESSURE: 67 MMHG | BODY MASS INDEX: 23.32 KG/M2 | WEIGHT: 140 LBS | HEART RATE: 84 BPM | HEIGHT: 65 IN

## 2018-02-15 DIAGNOSIS — M43.10 ACQUIRED SPONDYLOLISTHESIS: ICD-10-CM

## 2018-02-15 DIAGNOSIS — M47.812 CERVICAL SPONDYLOSIS WITHOUT MYELOPATHY: ICD-10-CM

## 2018-02-15 DIAGNOSIS — M50.30 DEGENERATION OF CERVICAL INTERVERTEBRAL DISC: ICD-10-CM

## 2018-02-15 DIAGNOSIS — M48.02 CERVICAL STENOSIS OF SPINAL CANAL: ICD-10-CM

## 2018-02-15 DIAGNOSIS — M48.02 FORAMINAL STENOSIS OF CERVICAL REGION: ICD-10-CM

## 2018-02-15 DIAGNOSIS — M54.2 NECK PAIN: Primary | ICD-10-CM

## 2018-02-15 DIAGNOSIS — M54.50 BILATERAL LOW BACK PAIN WITHOUT SCIATICA, UNSPECIFIED CHRONICITY: ICD-10-CM

## 2018-02-15 DIAGNOSIS — M51.37 DDD (DEGENERATIVE DISC DISEASE), LUMBOSACRAL: ICD-10-CM

## 2018-02-15 DIAGNOSIS — M47.819 SPONDYLOSIS WITHOUT MYELOPATHY: ICD-10-CM

## 2018-02-15 PROCEDURE — 3008F BODY MASS INDEX DOCD: CPT | Mod: S$GLB,,, | Performed by: PHYSICIAN ASSISTANT

## 2018-02-15 PROCEDURE — 99999 PR PBB SHADOW E&M-EST. PATIENT-LVL IV: CPT | Mod: PBBFAC,,, | Performed by: PHYSICIAN ASSISTANT

## 2018-02-15 PROCEDURE — 99214 OFFICE O/P EST MOD 30 MIN: CPT | Mod: S$GLB,,, | Performed by: PHYSICIAN ASSISTANT

## 2018-02-15 RX ORDER — CYCLOBENZAPRINE HCL 10 MG
10 TABLET ORAL 3 TIMES DAILY PRN
Qty: 90 TABLET | Refills: 0 | Status: SHIPPED | OUTPATIENT
Start: 2018-02-15 | End: 2018-03-17

## 2018-02-15 RX ORDER — ONDANSETRON 4 MG/1
8 TABLET, FILM COATED ORAL 2 TIMES DAILY
COMMUNITY
End: 2018-04-16 | Stop reason: CLARIF

## 2018-02-15 RX ORDER — HYDROCODONE BITARTRATE AND ACETAMINOPHEN 5; 325 MG/1; MG/1
1 TABLET ORAL EVERY 6 HOURS PRN
COMMUNITY
End: 2018-04-16

## 2018-02-28 DIAGNOSIS — M51.37 DDD (DEGENERATIVE DISC DISEASE), LUMBOSACRAL: ICD-10-CM

## 2018-02-28 DIAGNOSIS — M54.42 BILATERAL LOW BACK PAIN WITH LEFT-SIDED SCIATICA, UNSPECIFIED CHRONICITY: ICD-10-CM

## 2018-02-28 DIAGNOSIS — M54.12 BRACHIAL NEURITIS: ICD-10-CM

## 2018-02-28 DIAGNOSIS — M54.17 THORACIC AND LUMBOSACRAL NEURITIS: ICD-10-CM

## 2018-02-28 DIAGNOSIS — M54.14 THORACIC AND LUMBOSACRAL NEURITIS: ICD-10-CM

## 2018-02-28 DIAGNOSIS — M47.812 CERVICAL SPONDYLOSIS WITHOUT MYELOPATHY: ICD-10-CM

## 2018-02-28 DIAGNOSIS — M43.10 ACQUIRED SPONDYLOLISTHESIS: ICD-10-CM

## 2018-02-28 DIAGNOSIS — M50.30 DEGENERATION OF CERVICAL INTERVERTEBRAL DISC: ICD-10-CM

## 2018-02-28 DIAGNOSIS — M47.819 SPONDYLOSIS WITHOUT MYELOPATHY: ICD-10-CM

## 2018-02-28 DIAGNOSIS — M54.2 NECK PAIN: ICD-10-CM

## 2018-02-28 RX ORDER — GABAPENTIN 300 MG/1
CAPSULE ORAL
Qty: 270 CAPSULE | Refills: 2 | Status: SHIPPED | OUTPATIENT
Start: 2018-02-28 | End: 2019-01-25

## 2018-03-01 ENCOUNTER — HOSPITAL ENCOUNTER (OUTPATIENT)
Dept: PULMONOLOGY | Facility: CLINIC | Age: 58
Discharge: HOME | End: 2018-03-01
Payer: MEDICARE

## 2018-03-01 ENCOUNTER — HOSPITAL ENCOUNTER (OUTPATIENT)
Dept: RADIOLOGY | Facility: HOSPITAL | Age: 58
Discharge: HOME | End: 2018-03-01
Attending: INTERNAL MEDICINE
Payer: MEDICARE

## 2018-03-01 ENCOUNTER — OFFICE VISIT (OUTPATIENT)
Dept: PULMONOLOGY | Facility: CLINIC | Age: 58
End: 2018-03-01
Payer: MEDICARE

## 2018-03-01 VITALS
HEIGHT: 62 IN | OXYGEN SATURATION: 96 % | HEART RATE: 82 BPM | DIASTOLIC BLOOD PRESSURE: 74 MMHG | WEIGHT: 123.69 LBS | BODY MASS INDEX: 22.76 KG/M2 | SYSTOLIC BLOOD PRESSURE: 114 MMHG | RESPIRATION RATE: 14 BRPM

## 2018-03-01 DIAGNOSIS — J41.8 MIXED SIMPLE AND MUCOPURULENT CHRONIC BRONCHITIS: ICD-10-CM

## 2018-03-01 DIAGNOSIS — Z72.0 TOBACCO ABUSE: Primary | ICD-10-CM

## 2018-03-01 DIAGNOSIS — F31.9 BIPOLAR DISEASE, CHRONIC: ICD-10-CM

## 2018-03-01 DIAGNOSIS — F10.10 ALCOHOL ABUSE: ICD-10-CM

## 2018-03-01 LAB
POST FEV1 FVC: 0.77
POST FEV1: 1.86
POST FVC: 2.41
PRE FEV1 FVC: 74
PRE FEV1: 1.72
PRE FVC: 2.34
PREDICTED FEV1 FVC: 82
PREDICTED FEV1: 2.3
PREDICTED FVC: 2.84

## 2018-03-01 PROCEDURE — 71046 X-RAY EXAM CHEST 2 VIEWS: CPT | Mod: TC,FY

## 2018-03-01 PROCEDURE — 99204 OFFICE O/P NEW MOD 45 MIN: CPT | Mod: 25,S$GLB,, | Performed by: INTERNAL MEDICINE

## 2018-03-01 PROCEDURE — 71046 X-RAY EXAM CHEST 2 VIEWS: CPT | Mod: 26,,, | Performed by: RADIOLOGY

## 2018-03-01 PROCEDURE — 94060 EVALUATION OF WHEEZING: CPT | Mod: S$GLB,,, | Performed by: INTERNAL MEDICINE

## 2018-03-01 PROCEDURE — 94729 DIFFUSING CAPACITY: CPT | Mod: S$GLB,,, | Performed by: INTERNAL MEDICINE

## 2018-03-01 PROCEDURE — 99999 PR PBB SHADOW E&M-EST. PATIENT-LVL III: CPT | Mod: PBBFAC,,, | Performed by: INTERNAL MEDICINE

## 2018-03-01 NOTE — PATIENT INSTRUCTIONS
CBC, CXR, Pre/Post Spirometry, and DLCO.  Continue present respiratory medications (roles reviewed at today's visit).  Begin trial with either Spiriva or Incruse (she will check with pharmacy   regarding coverage and let me know which to order).  Urged to stop smoking.

## 2018-03-01 NOTE — LETTER
March 1, 2018      Marlen Li MD  [address] [facility] [address]           [facility]  [address]  [place] [address]  Phone: [phone]          Patient: Alayna Anderson   MR Number: [id number]   YOB: 1960   Date of Visit: 3/1/2018       Dear Dr. Marlen Li:    Thank you for referring Alayna Anderson to me for evaluation. Attached you will find relevant portions of my assessment and plan of care.    If you have questions, please do not hesitate to call me. I look forward to following Alayna Anderson along with you.    Sincerely,    ETTA Trevino MD    Enclosure  CC:  No Recipients    If you would like to receive this communication electronically, please contact [email or web address] or [phone] to request more information on [employer] access.    For providers and/or their staff who would like to refer a patient to [facility], please contact us through our one-stop-shop provider referral line, [facility], at [phone].    If you feel you have received this communication in error or would no longer like to receive these types of communications, please e-mail [email or web address]

## 2018-03-01 NOTE — PROGRESS NOTES
Subjective:       Patient ID: Alayna Anderson is a 57 y.o. female.    Chief Complaint: COPD    HPI Mrs. Anderson is a 57-year-old smoker who comes for assessment of chronic   obstructive lung disease.  She has been treated for a number of years for   chronic bronchitis/COPD.  She has never had a pulmonary evaluation, but has   taken various bronchodilator medications.  She describes a daily cough with   sputum which is often discolored.  She has not had any recent thoracic pain or   hemoptysis.  She is out of breath with moderate daily activities.    Outside notes indicate that Mrs. Anderson was treated at the beginning of February   for an acute exacerbation of obstructive lung disease.  At that time, she took   Augmentin and a short course of prednisone.  She states that these medications   did provide a temporary beneficial effect.  She has not had any recent   hospitalizations for treatment of respiratory symptoms.  She has not had any   pleuritic pain or hemoptysis.    Outside notes also indicate that Mrs. Anderson has the history of a bipolar   disorder and longstanding alcohol abuse.  She reports that she is not currently   drinking.    Mrs. Anderson smokes approximately 1/2 to 1-1/2 packs of cigarettes per day and   has smoked for more than 40 years.  She does not know any details of her family   medical history.  She has worked in the past in a wide range of jobs, but does   not know of any specific occupational exposures.    DATA:  I have reviewed the images from chest x-rays done in 2015, and 2014.  The   cardiac silhouette does not appear enlarged.  The diaphragm is flattened,   consistent with chronic obstructive lung disease.  There do not appear to be any   areas of consolidation or significant pleural effusions.      CB/IN  dd: 03/01/2018 19:17:00 (CST)  td: 03/02/2018 15:45:34 (CST)  Doc ID   #[id number]  Job ID #[id number]    CC:       Review of Systems   Constitutional: Positive for fatigue. Negative for fever.    HENT: Positive for congestion. Negative for postnasal drip, sinus pressure and voice change.    Respiratory: Positive for cough, sputum production and dyspnea on extertion. Negative for shortness of breath and wheezing.    Cardiovascular: Negative for chest pain and leg swelling.   Genitourinary: Negative for difficulty urinating.   Musculoskeletal: Positive for arthralgias. Negative for back pain.   Skin: Negative for rash.   Gastrointestinal: Negative for abdominal pain and acid reflux.   Neurological: Negative for dizziness and weakness.   Hematological: Negative for adenopathy.       Objective:      Physical Exam   Constitutional: She is oriented to person, place, and time. She appears well-developed and well-nourished.   HENT:   Head: Normocephalic.   Nose: Nose normal.   Mouth/Throat: No oropharyngeal exudate.   Neck: Normal range of motion. No JVD present. No tracheal deviation present. No thyromegaly present.   Cardiovascular: Normal rate, regular rhythm and normal heart sounds.    No murmur heard.  Pulmonary/Chest: Symmetric chest wall expansion. No stridor. She has decreased breath sounds. She has no wheezes. She has rhonchi (occas rhonchi). She has no rales. She exhibits no tenderness.   Abdominal: Soft. There is no tenderness.   Musculoskeletal: She exhibits no edema.   Lymphadenopathy:     She has no cervical adenopathy.   Neurological: She is alert and oriented to person, place, and time.   Skin: Skin is warm and dry. No rash noted. No erythema. Nails show no clubbing.   Psychiatric: She has a normal mood and affect.   Vitals reviewed.    Personal Diagnostic Review    No flowsheet data found.      Assessment:       1. Tobacco abuse    2. Mixed simple and mucopurulent chronic bronchitis    3. Alcohol abuse    4. Bipolar disease, chronic        Outpatient Encounter Prescriptions as of 3/1/2018   Medication Sig Dispense Refill    cyclobenzaprine (FLEXERIL) 10 MG tablet Take 1 tablet (10 mg total) by  mouth 3 (three) times daily as needed for Muscle spasms. 90 tablet 0    gabapentin (NEURONTIN) 300 MG capsule 3 po (900mg) tid 270 capsule 2    hydrocodone-acetaminophen 5-325mg (NORCO) 5-325 mg per tablet Take 1 tablet by mouth every 6 (six) hours as needed for Pain.      ibuprofen (ADVIL,MOTRIN) 800 MG tablet Take 1 tablet (800 mg total) by mouth 3 (three) times daily with meals. 90 tablet 2    ondansetron (ZOFRAN) 4 MG tablet Take 8 mg by mouth 2 (two) times daily.       No facility-administered encounter medications on file as of 3/1/2018.      Orders Placed This Encounter   Procedures    X-Ray Chest PA And Lateral     Standing Status:   Future     Standing Expiration Date:   3/1/2019    CBC auto differential     Standing Status:   Future     Standing Expiration Date:   3/1/2019    Spirometry with/without bronchodilator     Standing Status:   Future     Number of Occurrences:   1     Standing Expiration Date:   3/1/2019    DLCO-Carbon Monoxide Diffusing Capacity     Standing Status:   Future     Number of Occurrences:   1     Standing Expiration Date:   3/1/2019     Plan:     CBC, CXR, Pre/Post Spirometry, and DLCO.  Continue present respiratory medications (roles reviewed at today's visit).  Begin trial with either Spiriva or Incruse (she will check with pharmacy   regarding coverage and let me know which to order).  Urged to stop smoking.

## 2018-03-07 ENCOUNTER — DOCUMENTATION ONLY (OUTPATIENT)
Dept: REHABILITATION | Facility: OTHER | Age: 58
End: 2018-03-07

## 2018-03-07 NOTE — PROGRESS NOTES
Pt did not show for initial evaluation at [facility]. No further appts are scheduled at this time.

## 2018-04-16 RX ORDER — FLUTICASONE PROPIONATE 50 MCG
2 SPRAY, SUSPENSION (ML) NASAL DAILY
COMMUNITY

## 2018-04-16 RX ORDER — PANTOPRAZOLE SODIUM 40 MG/1
40 TABLET, DELAYED RELEASE ORAL DAILY
COMMUNITY

## 2018-04-16 RX ORDER — OXCARBAZEPINE 300 MG/1
150 TABLET, FILM COATED ORAL 2 TIMES DAILY
COMMUNITY

## 2018-04-16 RX ORDER — ALBUTEROL SULFATE 90 UG/1
2 AEROSOL, METERED RESPIRATORY (INHALATION) EVERY 6 HOURS PRN
COMMUNITY

## 2018-04-16 RX ORDER — QUETIAPINE FUMARATE 50 MG/1
50 TABLET, FILM COATED ORAL NIGHTLY
COMMUNITY

## 2018-04-16 RX ORDER — FLUTICASONE PROPIONATE 110 UG/1
1 AEROSOL, METERED RESPIRATORY (INHALATION) 2 TIMES DAILY
COMMUNITY

## 2018-04-16 NOTE — PRE ADMISSION SCREENING
Pre-op instructions given,verbalized understanding,patient stated her  will pick her up, she will make arrangements.

## 2018-04-17 ENCOUNTER — ANESTHESIA (OUTPATIENT)
Dept: SURGERY | Facility: OTHER | Age: 58
End: 2018-04-17
Payer: MEDICARE

## 2018-04-17 ENCOUNTER — HOSPITAL ENCOUNTER (OUTPATIENT)
Facility: OTHER | Age: 58
Discharge: HOME | End: 2018-04-17
Attending: OPHTHALMOLOGY | Admitting: OPHTHALMOLOGY
Payer: MEDICARE

## 2018-04-17 ENCOUNTER — ANESTHESIA EVENT (OUTPATIENT)
Dept: SURGERY | Facility: OTHER | Age: 58
End: 2018-04-17
Payer: MEDICARE

## 2018-04-17 ENCOUNTER — SURGERY (OUTPATIENT)
Age: 58
End: 2018-04-17

## 2018-04-17 VITALS
OXYGEN SATURATION: 95 % | BODY MASS INDEX: 23 KG/M2 | HEART RATE: 80 BPM | WEIGHT: 125 LBS | HEIGHT: 62 IN | DIASTOLIC BLOOD PRESSURE: 68 MMHG | SYSTOLIC BLOOD PRESSURE: 146 MMHG | TEMPERATURE: 98 F | RESPIRATION RATE: 16 BRPM

## 2018-04-17 DIAGNOSIS — H25.11 AGE-RELATED NUCLEAR CATARACT OF RIGHT EYE: Primary | ICD-10-CM

## 2018-04-17 PROCEDURE — 36000706: Performed by: OPHTHALMOLOGY

## 2018-04-17 PROCEDURE — V2632 POST CHMBR INTRAOCULAR LENS: HCPCS | Performed by: OPHTHALMOLOGY

## 2018-04-17 PROCEDURE — 25000003 PHARM REV CODE 250: Performed by: SPECIALIST

## 2018-04-17 PROCEDURE — 63600175 PHARM REV CODE 636 W HCPCS: Performed by: OPHTHALMOLOGY

## 2018-04-17 PROCEDURE — 36000707: Performed by: OPHTHALMOLOGY

## 2018-04-17 PROCEDURE — 71000015 HC POSTOP RECOV 1ST HR: Performed by: OPHTHALMOLOGY

## 2018-04-17 PROCEDURE — 37000008 HC ANESTHESIA 1ST 15 MINUTES: Performed by: OPHTHALMOLOGY

## 2018-04-17 PROCEDURE — 37000009 HC ANESTHESIA EA ADD 15 MINS: Performed by: OPHTHALMOLOGY

## 2018-04-17 PROCEDURE — 63600175 PHARM REV CODE 636 W HCPCS: Performed by: NURSE ANESTHETIST, CERTIFIED REGISTERED

## 2018-04-17 PROCEDURE — 25000003 PHARM REV CODE 250: Performed by: OPHTHALMOLOGY

## 2018-04-17 DEVICE — LENS 20.0: Type: IMPLANTABLE DEVICE | Site: EYE | Status: FUNCTIONAL

## 2018-04-17 RX ORDER — SODIUM CHLORIDE, SODIUM LACTATE, POTASSIUM CHLORIDE, CALCIUM CHLORIDE 600; 310; 30; 20 MG/100ML; MG/100ML; MG/100ML; MG/100ML
INJECTION, SOLUTION INTRAVENOUS CONTINUOUS PRN
Status: DISCONTINUED | OUTPATIENT
Start: 2018-04-17 | End: 2018-04-17

## 2018-04-17 RX ORDER — CYCLOPENTOLATE HYDROCHLORIDE 10 MG/ML
1 SOLUTION/ DROPS OPHTHALMIC EVERY 5 MIN PRN
Status: COMPLETED | OUTPATIENT
Start: 2018-04-17 | End: 2018-04-17

## 2018-04-17 RX ORDER — TROPICAMIDE 10 MG/ML
1 SOLUTION/ DROPS OPHTHALMIC EVERY 5 MIN PRN
Status: COMPLETED | OUTPATIENT
Start: 2018-04-17 | End: 2018-04-17

## 2018-04-17 RX ORDER — FENTANYL CITRATE 50 UG/ML
INJECTION, SOLUTION INTRAMUSCULAR; INTRAVENOUS
Status: DISCONTINUED | OUTPATIENT
Start: 2018-04-17 | End: 2018-04-17

## 2018-04-17 RX ORDER — PHENYLEPHRINE HYDROCHLORIDE 25 MG/ML
1 SOLUTION/ DROPS OPHTHALMIC EVERY 5 MIN PRN
Status: COMPLETED | OUTPATIENT
Start: 2018-04-17 | End: 2018-04-17

## 2018-04-17 RX ORDER — EPINEPHRINE 1 MG/ML
INJECTION, SOLUTION INTRACARDIAC; INTRAMUSCULAR; INTRAVENOUS; SUBCUTANEOUS
Status: DISCONTINUED | OUTPATIENT
Start: 2018-04-17 | End: 2018-04-17 | Stop reason: HOSPADM

## 2018-04-17 RX ORDER — MOXIFLOXACIN 5 MG/ML
1 SOLUTION/ DROPS OPHTHALMIC EVERY 5 MIN PRN
Status: COMPLETED | OUTPATIENT
Start: 2018-04-17 | End: 2018-04-17

## 2018-04-17 RX ORDER — LIDOCAINE HYDROCHLORIDE 20 MG/ML
JELLY TOPICAL
Status: DISCONTINUED | OUTPATIENT
Start: 2018-04-17 | End: 2018-04-17 | Stop reason: HOSPADM

## 2018-04-17 RX ORDER — LIDOCAINE HYDROCHLORIDE 40 MG/ML
INJECTION, SOLUTION RETROBULBAR
Status: DISCONTINUED | OUTPATIENT
Start: 2018-04-17 | End: 2018-04-17 | Stop reason: HOSPADM

## 2018-04-17 RX ORDER — MOXIFLOXACIN 5 MG/ML
SOLUTION/ DROPS OPHTHALMIC
Status: DISCONTINUED | OUTPATIENT
Start: 2018-04-17 | End: 2018-04-17 | Stop reason: HOSPADM

## 2018-04-17 RX ORDER — MIDAZOLAM HYDROCHLORIDE 1 MG/ML
INJECTION INTRAMUSCULAR; INTRAVENOUS
Status: DISCONTINUED | OUTPATIENT
Start: 2018-04-17 | End: 2018-04-17

## 2018-04-17 RX ORDER — ACETAMINOPHEN 325 MG/1
650 TABLET ORAL EVERY 4 HOURS PRN
Status: DISCONTINUED | OUTPATIENT
Start: 2018-04-17 | End: 2018-04-17 | Stop reason: HOSPADM

## 2018-04-17 RX ORDER — PREDNISOLONE ACETATE 10 MG/ML
SUSPENSION/ DROPS OPHTHALMIC
Status: DISCONTINUED | OUTPATIENT
Start: 2018-04-17 | End: 2018-04-17 | Stop reason: HOSPADM

## 2018-04-17 RX ADMIN — TRYPAN BLUE 2 ML: 0.3 INJECTION, SOLUTION INTRAOCULAR; OPHTHALMIC at 09:04

## 2018-04-17 RX ADMIN — PHENYLEPHRINE HYDROCHLORIDE 1 DROP: 25 SOLUTION/ DROPS OPHTHALMIC at 07:04

## 2018-04-17 RX ADMIN — MOXIFLOXACIN HYDROCHLORIDE 1 DROP: 5 SOLUTION/ DROPS OPHTHALMIC at 07:04

## 2018-04-17 RX ADMIN — EPINEPHRINE 1.3 ML: 1 INJECTION, SOLUTION INTRAMUSCULAR; SUBCUTANEOUS at 09:04

## 2018-04-17 RX ADMIN — TROPICAMIDE 1 DROP: 10 SOLUTION/ DROPS OPHTHALMIC at 07:04

## 2018-04-17 RX ADMIN — LIDOCAINE HYDROCHLORIDE 1 ML: 20 JELLY TOPICAL at 09:04

## 2018-04-17 RX ADMIN — PREDNISOLONE ACETATE 1 DROP: 10 SUSPENSION/ DROPS OPHTHALMIC at 09:04

## 2018-04-17 RX ADMIN — MIDAZOLAM HYDROCHLORIDE 2 MG: 1 INJECTION, SOLUTION INTRAMUSCULAR; INTRAVENOUS at 09:04

## 2018-04-17 RX ADMIN — CYCLOPENTOLATE HYDROCHLORIDE 1 DROP: 10 SOLUTION/ DROPS OPHTHALMIC at 07:04

## 2018-04-17 RX ADMIN — SODIUM CHONDROITIN SULFATE / SODIUM HYALURONATE 4 ML: 0.55-0.5 INJECTION INTRAOCULAR at 09:04

## 2018-04-17 RX ADMIN — BALANCED SALT SOLUTION ENRICHED WITH BICARBONATE, DEXTROSE, AND GLUTATHIONE 500 ML: KIT at 09:04

## 2018-04-17 RX ADMIN — MOXIFLOXACIN HYDROCHLORIDE 1 DROP: 5 SOLUTION/ DROPS OPHTHALMIC at 09:04

## 2018-04-17 RX ADMIN — FENTANYL CITRATE 25 MCG: 50 INJECTION, SOLUTION INTRAMUSCULAR; INTRAVENOUS at 09:04

## 2018-04-17 RX ADMIN — SODIUM CHLORIDE, SODIUM LACTATE, POTASSIUM CHLORIDE, AND CALCIUM CHLORIDE: 600; 310; 30; 20 INJECTION, SOLUTION INTRAVENOUS at 08:04

## 2018-04-17 RX ADMIN — LIDOCAINE HYDROCHLORIDE 1 ML: 40 INJECTION, SOLUTION RETROBULBAR; TOPICAL at 09:04

## 2018-04-17 NOTE — SUBJECTIVE & OBJECTIVE
No current facility-administered medications for this encounter.        Review of Systems  Objective:     Vital Signs (Most Recent):  Temp: 98.3 °F (36.8 °C) (04/17/18 0757)  Pulse: 81 (04/17/18 0757)  Resp: 16 (04/17/18 0757)  BP: 134/88 (04/17/18 0757)  SpO2: 96 % (04/17/18 0757) Vital Signs (24h Range):  Temp:  [98.3 °F (36.8 °C)] 98.3 °F (36.8 °C)  Pulse:  [81] 81  Resp:  [16] 16  SpO2:  [96 %] 96 %  BP: (134)/(88) 134/88     Weight: 56.7 kg (125 lb)  Body mass index is 22.86 kg/m².    Significant Labs:  {Results:10567}    Significant Imaging:  {Results; Diagnostics:69134}

## 2018-04-17 NOTE — DISCHARGE SUMMARY
Patient had uneventful CE IOL and did well in hospitalization and given postop instructions and will be seen by Dr. Wang at [facility] at 8 am tomorrow

## 2018-04-17 NOTE — ANESTHESIA POSTPROCEDURE EVALUATION
"Anesthesia Post Evaluation    Patient: Alayna Anderson    Procedure(s) Performed: Procedure(s) (LRB):  PHACOEMULSIFICATION-ASPIRATION-CATARACT (Right)  INSERTION-INTRAOCULAR LENS (IOL) (Right)    Final Anesthesia Type: MAC  Patient location during evaluation: [facility]  Patient participation: Yes- Able to Participate  Level of consciousness: awake and alert  Post-procedure vital signs: reviewed and stable  Pain management: adequate  Airway patency: patent  PONV status at discharge: No PONV  Anesthetic complications: no      Cardiovascular status: blood pressure returned to baseline  Respiratory status: unassisted and spontaneous ventilation  Hydration status: euvolemic  Follow-up not needed.        Visit Vitals  /88 (BP Location: Left arm, Patient Position: Sitting)   Pulse 81   Temp 36.8 °C (98.3 °F) (Oral)   Resp 16   Ht 5' 2" (1.575 m)   Wt 56.7 kg (125 lb)   SpO2 96%   Breastfeeding? No   BMI 22.86 kg/m²       Pain/Jose Alberto Score: Presence of Pain: complains of pain/discomfort (4/17/2018  7:58 AM)      "

## 2018-04-17 NOTE — DISCHARGE INSTRUCTIONS
Discharge Instructions for Cataract Surgery  A surgeon removed the cloudy lens in your eye and replaced it with a clear man-made lens. Be sure to have an adult family member or friend drive you home after surgery. Heres what you can expect following surgery and tips for a healthy recovery.  It is normal to have the following:  · Bruised or bloodshot eye for 7 days  · Itching and mild discomfort for several days  · Some fluid discharge  · Sensitivity to light  · Scratchy, sandlike feeling in the eye for 2 weeks  · Feeling tired, especially during the first 24 hours  Activity level  · Do not drive for 2 days or as instructed by your doctor.  · Do not drink alcohol for at least 24 hours.  · Avoid bending at the waist to  objects or lifting anything heavy for 2 days.  · Relax for the first 24 hours after surgery. Watching TV and reading are OK and wont harm your eye.  Eye protection  · Do not rub or press on your eye.  · Sleep on your back or on your unoperated side for 2 nights.  · If instructed, wear a bandage over your eye for 2 days and 2 nights.  · If instructed, wear a shield to protect your eye for 2 days and 2 nights.  Using eyedrops  You may be given special eyedrops or ointment. Here is one way to use eyedrops:  · Tilt your head back.  · Pull your bottom eyelid down.  · Squeeze one drop into your eye. Do not touch your eye with the bottle tip.  · Close your eyes for a few seconds.  · If you need more than one drop, wait a few minutes before adding the next one.   Call your doctor right away if you have any of the following:  · Bleeding or discharge from the eye  · Your vision suddenly becomes worse  · Pain medicine you are told to take does not ease your pain  · Nausea or vomiting  · Chills or fever over 100.4°F (39.1°C)   Date Last Reviewed: 5/30/2015  © 0354-0255 [employer]. [address] [place] [address], [place] [address]. All rights reserved. This information is not intended as a  substitute for professional medical care. Always follow your healthcare professional's instructions.          Anesthesia: Monitored Anesthesia Care (MAC)    Youre due to have surgery. During surgery, youll be given medicine called anesthesia. This will keep you comfortable and pain-free. Your surgeon will use monitored anesthesia care (MAC). This sheet tells you more about this type of anesthesia.  What is monitored anesthesia care?  MAC keeps you very drowsy during surgery. You may be awake, but you will likely not remember much. And you wont feel pain. With MAC, medicines are given through an IV line into a vein in your arm or hand. A local anesthetic will usually be injected into the skin and muscle around the surgical site to numb it. The anesthesia provider monitors you during the procedure. He or she checks your heart rate and rhythm, blood pressure, and blood oxygen level.  Anesthesia tools and medicines that may be near you during your procedure  You will likely have:  · A pulse oximeter on the end of your finger. This measures your blood oxygen level.  · Electrocardiography leads (electrodes) on your chest. These record your heart rate and rhythm.  · Medicines given through an IV. These relax you and prevent pain. You may be awake or sleep lightly. If you have local anesthetic, it is injected directly into your skin.  · A facemask to give you oxygen, if needed.  Risks and possible complications  MAC has some risks. These include:  · Breathing problems  · Nausea and vomiting  · Allergic reaction to the anesthetic    Anesthesia safety  Tips for anesthesia safety include the following:   · Follow all instructions you are given for how long not to eat or drink before your procedure.  · Be sure your healthcare provider knows what medicines you take, especially any anti-inflammatory medicine or blood thinners. This includes aspirin and any other over-the-counter medicines, herbs, and supplements.  · Have an  adult family member or friend drive you home after the procedure.  · For the first 24 hours after your surgery:  ¨ Do not drive or use heavy equipment.  ¨ Do not make important decisions or sign documents.  ¨ Avoid alcohol.  ¨ Have someone stay with you, if possible. They can watch for problems and help keep you safe.  Date Last Reviewed: 12/1/2016 © 2000-2017 [employer]. [address] [place] [address], [place] [address]. All rights reserved. This information is not intended as a substitute for professional medical care. Always follow your healthcare professional's instructions.

## 2018-04-17 NOTE — OP NOTE
Operative Note    Date of Operation: 4/17/2018    Surgeon:  Paris Wang MD    PREOPERATIVE DIAGNOSIS(ES):  1. Visually significant cataract, right  eye    POSTOPERATIVE DIAGNOSIS(ES):  1. Visually significant cataract, right  eye    PROCEDURE(S) PERFORMED:  1. Phacoemulsification with posterior chamber intraocular lens implantation, right eye    ANESTHESIA: Monitored anesthesia care with intracameral shugarcaine.    ESTIMATED BLOOD LOSS: <1 mL    COMPLICATIONS: None    DISCHARGE CONDITION: Good    HISTORY OF PRESENT ILLNESS: Alayna Anderson is a 57 y.o. female with a visually significant cataract in the right  eye that interferes with activities of daily living.  After the risks, benefits, alternatives and complications were discussed with the patient, all questions were answered, the patient elected to proceed with cataract extraction with intraocular lens implantation and signed informed consent. Patient had decreased vision also in left eye with dislocated lens and decision was made to perform CE IOL OD first with patient requesting to have distance rather than near vision. Because vision so poor in other eye, anisometropia discussed but less likely and can be addressed if CE IOL none.    PROCEDURE SUMMARY:  The patient's history, physical, and consent were reviewed, and the patient was correctly identified in the preoperative holding area.  An IV was started, and the patient was brought into the operating suite where monitors were placed.  After the patients eye was properly anesthesized with topical anesthetic, the patient was then prepped and draped in the usual sterile manner for ophthalmic surgery.    The microscope was positioned over the righty  eye.  A lid speculum was placed.  A super sharp blade was used to make a paracentesis incision.  The anterior chamber was filled with shugarcaine, then trypan blue which was  irrigated and then viscoelastic and a 2.4 mm corneal wound was created using a keratome after trypan blue was placed and irrigated as was shugarcaine. A continuous curvilinear capsulorrhexis was completed using a cystotome. Hydrodissection and hydrodelineation was performed with BSS on a blunt cannula. Throughout case patient, moving and jumped multiple times during procedure making procedure difficult and dangerous despite urging from md and anesthesia. The lens was removed via phacoemulsification using a divide and conquer technique.  Residual cortex was removed with irrigation/aspiration handpiece.  The capsular bag was filled with viscoelastic and an Lorenzo SN60WF, power 20.00, was injected into the capsular bag.  Residual viscoelastic was removed with the irrigation/aspiration hand.  The wounds were hydrated with BSS and found to be water-tight. The lid speculum was removed and the eye cleansed with a wet-to-dry method.  Vigamox drops and Maxitrol ointment were placed on the eye, and a patch and shield placed over the eye.  The patient tolerated the procedure well and was taken to the recovery room in stable condition.  The patient will follow up in the eye clinic tomorrow.      Electronically signed by: Paris Wang MD

## 2018-04-17 NOTE — OR NURSING
Alayna Frida Justin has met all discharge criteria from Phase II. Vital Signs are stable, ambulating  without difficulty. Discharge instructions given, patient verbalized understanding. Discharged from facility via wheelchair in stable condition.

## 2018-04-17 NOTE — ANESTHESIA PREPROCEDURE EVALUATION
04/17/2018  Alayna Anderson is a 57 y.o., female.    Anesthesia Evaluation    I have reviewed the Patient Summary Reports.    I have reviewed the Nursing Notes.   I have reviewed the Medications.     Review of Systems  Anesthesia Hx:  No problems with previous Anesthesia    Social:  Smoker, Former Smoker Cocaine   Hematology/Oncology:  Hematology Normal   Oncology Normal     EENT/Dental:EENT/Dental Normal   Cardiovascular:  Cardiovascular Normal Exercise tolerance: good     Pulmonary:   Pneumonia COPD, moderate Asthma asymptomatic    Renal/:  Renal/ Normal     Hepatic/GI:  Hepatic/GI Normal    Musculoskeletal:   Arthritis     Neurological:   Seizures, well controlled    Psych:   Bipolar         Physical Exam  General:  Obesity    Airway/Jaw/Neck:  Airway Findings: Mouth Opening: Normal Tongue: Normal  General Airway Assessment: Adult, Average  Mallampati: II  TM Distance: Normal, at least 6 cm            Mental Status:  Mental Status Findings:  Cooperative, Alert and Oriented         Anesthesia Plan  Type of Anesthesia, risks & benefits discussed:  Anesthesia Type:  MAC  Patient's Preference:   Intra-op Monitoring Plan: standard ASA monitors  Intra-op Monitoring Plan Comments:   Post Op Pain Control Plan: per primary service following discharge from PACU  Post Op Pain Control Plan Comments:   Induction:    Beta Blocker:         Informed Consent: Patient understands risks and agrees with Anesthesia plan.  Questions answered. Anesthesia consent signed with patient.  ASA Score: 3     Day of Surgery Review of History & Physical:    H&P update referred to the surgeon.         Ready For Surgery From Anesthesia Perspective.

## 2018-04-30 ENCOUNTER — HOSPITAL ENCOUNTER (OUTPATIENT)
Dept: RADIOLOGY | Facility: OTHER | Age: 58
Discharge: HOME | End: 2018-04-30
Attending: INTERNAL MEDICINE
Payer: MEDICARE

## 2018-04-30 DIAGNOSIS — R10.9 FLANK PAIN: ICD-10-CM

## 2018-04-30 DIAGNOSIS — F10.10 AA (ALCOHOL ABUSE): ICD-10-CM

## 2018-04-30 DIAGNOSIS — R63.0 LACK OF APPETITE: ICD-10-CM

## 2018-04-30 DIAGNOSIS — R11.0 NAUSEA: ICD-10-CM

## 2018-04-30 DIAGNOSIS — R19.8 CHANGE IN BOWEL FUNCTION: ICD-10-CM

## 2018-04-30 PROCEDURE — 76700 US EXAM ABDOM COMPLETE: CPT | Mod: 26,,, | Performed by: RADIOLOGY

## 2018-04-30 PROCEDURE — 76700 US EXAM ABDOM COMPLETE: CPT | Mod: TC

## 2018-05-15 DIAGNOSIS — M79.641 RIGHT HAND PAIN: Primary | ICD-10-CM

## 2018-05-16 ENCOUNTER — HOSPITAL ENCOUNTER (OUTPATIENT)
Dept: RADIOLOGY | Facility: OTHER | Age: 58
Discharge: HOME | End: 2018-05-16
Attending: PLASTIC SURGERY
Payer: MEDICARE

## 2018-05-16 ENCOUNTER — OFFICE VISIT (OUTPATIENT)
Dept: ORTHOPEDICS | Facility: CLINIC | Age: 58
End: 2018-05-16
Payer: MEDICARE

## 2018-05-16 VITALS
SYSTOLIC BLOOD PRESSURE: 122 MMHG | WEIGHT: 125 LBS | HEIGHT: 62 IN | HEART RATE: 86 BPM | BODY MASS INDEX: 23 KG/M2 | DIASTOLIC BLOOD PRESSURE: 77 MMHG

## 2018-05-16 DIAGNOSIS — M79.641 RIGHT HAND PAIN: Primary | ICD-10-CM

## 2018-05-16 DIAGNOSIS — M79.641 RIGHT HAND PAIN: ICD-10-CM

## 2018-05-16 DIAGNOSIS — G56.01 CARPAL TUNNEL SYNDROME ON RIGHT: ICD-10-CM

## 2018-05-16 PROCEDURE — 99999 PR PBB SHADOW E&M-EST. PATIENT-LVL IV: CPT | Mod: PBBFAC,,, | Performed by: PLASTIC SURGERY

## 2018-05-16 PROCEDURE — 73130 X-RAY EXAM OF HAND: CPT | Mod: 26,RT,, | Performed by: INTERNAL MEDICINE

## 2018-05-16 PROCEDURE — 20526 THER INJECTION CARP TUNNEL: CPT | Mod: RT,S$GLB,, | Performed by: PLASTIC SURGERY

## 2018-05-16 PROCEDURE — 3008F BODY MASS INDEX DOCD: CPT | Mod: CPTII,S$GLB,, | Performed by: PLASTIC SURGERY

## 2018-05-16 PROCEDURE — 73130 X-RAY EXAM OF HAND: CPT | Mod: TC,FY,RT

## 2018-05-16 PROCEDURE — 99214 OFFICE O/P EST MOD 30 MIN: CPT | Mod: 25,S$GLB,, | Performed by: PLASTIC SURGERY

## 2018-05-16 RX ORDER — TRIAMCINOLONE ACETONIDE 40 MG/ML
40 INJECTION, SUSPENSION INTRA-ARTICULAR; INTRAMUSCULAR
Status: COMPLETED | OUTPATIENT
Start: 2018-05-16 | End: 2018-05-16

## 2018-05-16 RX ORDER — LIDOCAINE HYDROCHLORIDE 10 MG/ML
1 INJECTION, SOLUTION EPIDURAL; INFILTRATION; INTRACAUDAL; PERINEURAL
Status: COMPLETED | OUTPATIENT
Start: 2018-05-16 | End: 2018-05-16

## 2018-05-16 RX ORDER — SODIUM CHLORIDE 9 MG/ML
INJECTION, SOLUTION INTRAVENOUS CONTINUOUS
Status: CANCELLED | OUTPATIENT
Start: 2018-05-16

## 2018-05-16 RX ADMIN — LIDOCAINE HYDROCHLORIDE 10 MG: 10 INJECTION, SOLUTION EPIDURAL; INFILTRATION; INTRACAUDAL; PERINEURAL at 12:05

## 2018-05-16 RX ADMIN — TRIAMCINOLONE ACETONIDE 40 MG: 40 INJECTION, SUSPENSION INTRA-ARTICULAR; INTRAMUSCULAR at 12:05

## 2018-05-16 NOTE — PROGRESS NOTES
"HISTORY OF PRESENT ILLNESS:  Ms. Anderson returns to the clinic complaining of   right hand numbness and tingling as well as pain that distributes into the   median distribution of her hand.  She denies any recent history of trauma.  She   states that the injection that was received in December has worked for several   months.  She would like to discuss other treatment options.  She has no other   complaints today.    PHYSICAL EXAMINATION:  VITAL SIGNS: /77 (BP Location: Left arm, Patient Position: Sitting, BP Method: Medium (Automatic))   Pulse 86   Ht 5' 2" (1.575 m)   Wt 56.7 kg (125 lb)   BMI 22.86 kg/m²     GENERAL:  No acute distress, alert and oriented x3, cooperative, well nourished.  LUNGS:  Unlabored on room air.  CARDIOVASCULAR:  Distal pulses intact.  Good capillary refill.  No cyanosis,   clubbing or edema.  EXTREMITIES:  Right upper extremity, she has full range of motion of all digits   at all joints.  There is mild thenar atrophy.  She has good opposition, strong   intrinsics and an exquisitely positive Tinel's over the carpal tunnel.    PROCEDURE:  I have explained the risks, benefits, and alternatives of the procedure in detail.  The patient voices understanding and all questions have been answered.  The patient agrees to proceed as planned. After a sterile prep of the skin the right carpal tunnel is injected from the volar approach using a 25 gauge needle with a combination of 1cc 1% plain xylocaine and 40 mg of Kenalog.  The patient is cautioned and immediate relief of pain is secondary to the local anesthetic and will be temporary.  After the anesthetic wears off there may be a increase in pain that may last for a few hours or a few days and they should use ice to help alleviate this flair up of pain.       ASSESSMENT:  Right carpal tunnel syndrome.    PLAN:  I discussed performing a repeat injection today to help buy time before   the patient can undergo a carpal tunnel release.  I " discussed the risks,   benefits and alternatives of the carpal tunnel release with the patient in   detail and she wished to proceed.  Informed consent was obtained at this time   and the patient was then scheduled for procedure on 07/13/2018.      KOBE/ELZBIETA  dd: 05/16/2018 12:11:17 (CDT)  td: 05/17/2018 06:51:03 (CDT)  Doc ID   #[id number]  Job ID #[id number]    CC:       Dictation Confirmation Code: [id number]  Fransisco Jin Jr. MD  05/16/2018  12:09 PM

## 2018-05-30 ENCOUNTER — OFFICE VISIT (OUTPATIENT)
Dept: ORTHOPEDICS | Facility: CLINIC | Age: 58
End: 2018-05-30
Payer: MEDICARE

## 2018-05-30 ENCOUNTER — HOSPITAL ENCOUNTER (OUTPATIENT)
Dept: RADIOLOGY | Facility: HOSPITAL | Age: 58
Discharge: HOME | End: 2018-05-30
Attending: PHYSICIAN ASSISTANT
Payer: MEDICARE

## 2018-05-30 DIAGNOSIS — M25.552 LEFT HIP PAIN: Primary | ICD-10-CM

## 2018-05-30 DIAGNOSIS — M25.352 HIP INSTABILITY, LEFT: ICD-10-CM

## 2018-05-30 DIAGNOSIS — M25.552 LEFT HIP PAIN: ICD-10-CM

## 2018-05-30 PROCEDURE — 73502 X-RAY EXAM HIP UNI 2-3 VIEWS: CPT | Mod: 26,LT,, | Performed by: RADIOLOGY

## 2018-05-30 PROCEDURE — 99999 PR PBB SHADOW E&M-EST. PATIENT-LVL III: CPT | Mod: PBBFAC,,, | Performed by: PHYSICIAN ASSISTANT

## 2018-05-30 PROCEDURE — 99213 OFFICE O/P EST LOW 20 MIN: CPT | Mod: S$GLB,,, | Performed by: PHYSICIAN ASSISTANT

## 2018-05-30 PROCEDURE — 73502 X-RAY EXAM HIP UNI 2-3 VIEWS: CPT | Mod: TC,LT

## 2018-05-31 ENCOUNTER — TELEPHONE (OUTPATIENT)
Dept: ORTHOPEDICS | Facility: CLINIC | Age: 58
End: 2018-05-31

## 2018-05-31 NOTE — PROGRESS NOTES
Subjective:      Patient ID: Alayna Anderson is a 57 y.o. female.    Chief Complaint: No chief complaint on file.    HPI    Patient is a 57 year old female who presents to clinic with chief complaint of left hip pain and chronic dislocation. Patient stated that her hip will intermittently pop out of place causing her leg to give out and fall. She stated that it has done this for years.  She dies not remember a specific trauma to her hip, though stated the she has had a lot of significant injuries in the past and that this could have happened at that time. Patient stated that she can reproduce symptoms. She has history of lower back pain.     Review of Systems   Constitution: Negative for chills and fever.   Cardiovascular: Negative for chest pain.   Respiratory: Negative for cough and shortness of breath.    Skin: Negative for color change, dry skin, itching, nail changes, poor wound healing and rash.   Musculoskeletal:        Left hip instability.    Neurological: Negative for dizziness.   Psychiatric/Behavioral: Negative for altered mental status. The patient is not nervous/anxious.    All other systems reviewed and are negative.        Objective:      General    Constitutional: She is oriented to person, place, and time. She appears well-developed and well-nourished. No distress.   HENT:   Head: Atraumatic.   Eyes: Conjunctivae are normal.   Cardiovascular: Normal rate.    Pulmonary/Chest: Effort normal.   Neurological: She is alert and oriented to person, place, and time.   Psychiatric: She has a normal mood and affect. Her behavior is normal.         Left Hip Exam     Range of Motion   The patient has normal left hip ROM.    Muscle Strength   The patient has normal left hip strength.     Tests   Log Roll: negative    Other   Sensation: normal    Comments:  Audible cluck noted             RADS: Mild DJD.  No fracture or dislocation.  No bone destruction identified  Assessment:       Encounter Diagnoses    Name Primary?    Left hip pain Yes    Hip instability, left           Plan:       Discussed plan with patient. At this time order for MRI placed. She is to call for results. Follow up is pending MRI results.

## 2018-06-18 ENCOUNTER — HOSPITAL ENCOUNTER (OUTPATIENT)
Dept: RADIOLOGY | Facility: HOSPITAL | Age: 58
Discharge: HOME | End: 2018-06-18
Attending: PHYSICIAN ASSISTANT
Payer: MEDICARE

## 2018-06-18 DIAGNOSIS — M25.552 LEFT HIP PAIN: ICD-10-CM

## 2018-06-18 PROCEDURE — 72195 MRI PELVIS W/O DYE: CPT | Mod: 26,,, | Performed by: RADIOLOGY

## 2018-06-18 PROCEDURE — 72195 MRI PELVIS W/O DYE: CPT | Mod: TC

## 2018-07-12 ENCOUNTER — HOSPITAL ENCOUNTER (OUTPATIENT)
Dept: PREADMISSION TESTING | Facility: OTHER | Age: 58
Discharge: HOME | End: 2018-07-12
Attending: PLASTIC SURGERY
Payer: MEDICARE

## 2018-07-12 ENCOUNTER — TELEPHONE (OUTPATIENT)
Dept: ORTHOPEDICS | Facility: CLINIC | Age: 58
End: 2018-07-12

## 2018-07-12 ENCOUNTER — ANESTHESIA EVENT (OUTPATIENT)
Dept: SURGERY | Facility: OTHER | Age: 58
End: 2018-07-12
Payer: MEDICARE

## 2018-07-12 VITALS
BODY MASS INDEX: 22.45 KG/M2 | TEMPERATURE: 98 F | HEART RATE: 98 BPM | HEIGHT: 62 IN | SYSTOLIC BLOOD PRESSURE: 86 MMHG | OXYGEN SATURATION: 97 % | WEIGHT: 122 LBS | DIASTOLIC BLOOD PRESSURE: 48 MMHG

## 2018-07-12 RX ORDER — LIDOCAINE HYDROCHLORIDE 10 MG/ML
0.5 INJECTION, SOLUTION EPIDURAL; INFILTRATION; INTRACAUDAL; PERINEURAL ONCE
Status: CANCELLED | OUTPATIENT
Start: 2018-07-12 | End: 2018-07-12

## 2018-07-12 RX ORDER — LANOLIN ALCOHOL/MO/W.PET/CERES
100 CREAM (GRAM) TOPICAL DAILY
COMMUNITY

## 2018-07-12 RX ORDER — SODIUM CHLORIDE, SODIUM LACTATE, POTASSIUM CHLORIDE, CALCIUM CHLORIDE 600; 310; 30; 20 MG/100ML; MG/100ML; MG/100ML; MG/100ML
INJECTION, SOLUTION INTRAVENOUS CONTINUOUS
Status: CANCELLED | OUTPATIENT
Start: 2018-07-12

## 2018-07-12 RX ORDER — ATORVASTATIN CALCIUM 20 MG/1
20 TABLET, FILM COATED ORAL NIGHTLY
COMMUNITY
End: 2020-09-10 | Stop reason: CLARIF

## 2018-07-12 NOTE — TELEPHONE ENCOUNTER
Spoke with pt , 5:45am arrival time for surgery tomorrow 7/13/18 with . [facility] 1st floor check in. Pt verbalized understanding.

## 2018-07-12 NOTE — PRE ADMISSION SCREENING
Pt bp on right arm auto cuff 77/50, on left arm auto cuff 79/47.  Manual cuff on left arm 86/48.  Informed Dr. Foley, anesthesia.  Patient does not take antihypertensives.  No new orders

## 2018-07-12 NOTE — DISCHARGE INSTRUCTIONS
PRE-ADMIT TESTING -  [phone]    [address]  [facility]          Your surgery has been scheduled at [facility]. We are pleased to have the opportunity to serve you. For Further Information please call [phone].    On the day of surgery please report to the Information Desk on the 1st floor.    · CONTACT YOUR PHYSICIAN'S OFFICE THE DAY PRIOR TO YOUR SURGERY TO OBTAIN YOUR ARRIVAL TIME.     · The evening before surgery do not eat anything after 9 p.m. ( this includes hard candy, chewing gum and mints).  You may only have GATORADE, POWERADE AND WATER  from 9 p.m. until you leave your home.   DO NOT DRINK ANY LIQUIDS ON THE WAY TO THE HOSPITAL.      SPECIAL MEDICATION INSTRUCTIONS: TAKE medications checked off by the Anesthesiologist on your Medication List.    Angiogram Patients: Take medications as instructed by your physician, including aspirin.     Surgery Patients:    If you take ASPIRIN - Your PHYSICIAN/SURGEON will need to inform you IF/OR when you need to stop taking aspirin prior to your surgery.     Do Not take any medications containing IBUPROFEN.  Do Not Wear any make-up or dark nail polish   (especially eye make-up) to surgery. If you come to surgery with makeup on you will be required to remove the makeup or nail polish.    Do not shave your surgical area at least 5 days prior to your surgery. The surgical prep will be performed at the hospital according to Infection Control regulations.    Leave all valuables at home.   Do Not wear any jewelry or watches, including any metal in body piercings.  Contact Lens must be removed before surgery. Either do not wear the contact lens or bring a case and solution for storage.  Please bring a container for eyeglasses or dentures as required.  Bring any paperwork your physician has provided, such as consent forms,  history and physicals, doctor's orders, etc.   Bring comfortable clothes that are loose fitting to wear upon  discharge. Take into consideration the type of surgery being performed.  Maintain your diet as advised per your physician the day prior to surgery.      Adequate rest the night before surgery is advised.   Park in the Parking lot behind the hospital or in the [facility] across the street from the parking lot. Parking is complimentary.  If you will be discharged the same day as your procedure, please arrange for a responsible adult to drive you home or to accompany you if traveling by taxi.   YOU WILL NOT BE PERMITTED TO DRIVE OR TO LEAVE THE HOSPITAL ALONE AFTER SURGERY.   It is strongly recommended that you arrange for someone to remain with you for the first 24 hrs following your surgery.       Thank you for your cooperation.  The Staff of [facility].        Bathing Instructions                                                                 Please shower the evening before and morning of your procedure with    ANTIBACTERIAL SOAP. ( DIAL, etc )  Concentrate on the surgical area   for at least 3 minutes and rinse completely. Dry off as usual.   Do not use any deodorant, powder, body lotions, perfume, after shave or    cologne.

## 2018-07-12 NOTE — ANESTHESIA PREPROCEDURE EVALUATION
07/12/2018  Alayna Anderson is a 57 y.o., female.    Anesthesia Evaluation    I have reviewed the Patient Summary Reports.    I have reviewed the Nursing Notes.   I have reviewed the Medications.     Review of Systems  Anesthesia Hx:  No problems with previous Anesthesia  History of prior surgery of interest to airway management or planning: Denies Family Hx of Anesthesia complications.   Denies Personal Hx of Anesthesia complications.   Social:  Smoker    Hematology/Oncology:  Hematology Normal   Oncology Normal     EENT/Dental:EENT/Dental Normal   Cardiovascular:   Exercise tolerance: good hyperlipidemia    Pulmonary:   COPD, mild Asthma mild    Renal/:  Renal/ Normal     Hepatic/GI:  Hepatic/GI Normal    Musculoskeletal:   Arthritis     Neurological:   Seizures, poorly controlled On meds   Psych:   Psychiatric History          Physical Exam  General:  Malnutrition    Airway/Jaw/Neck:  Airway Findings: Mouth Opening: Normal Tongue: Normal  Mallampati: I      Dental:  Dental Findings: Upper Dentures, Lower Dentures        Mental Status:  Mental Status Findings:  Cooperative, Alert and Oriented         Anesthesia Plan  Type of Anesthesia, risks & benefits discussed:  Anesthesia Type:  MAC  Patient's Preference:   Intra-op Monitoring Plan: standard ASA monitors  Intra-op Monitoring Plan Comments:   Post Op Pain Control Plan:   Post Op Pain Control Plan Comments:   Induction:   IV  Beta Blocker:         Informed Consent: Patient understands risks and agrees with Anesthesia plan.  Questions answered. Anesthesia consent signed with patient.  ASA Score: 3     Day of Surgery Review of History & Physical:    H&P update referred to the surgeon.     Anesthesia Plan Notes: Plan MAc,Keep simple        Ready For Surgery From Anesthesia Perspective.

## 2018-07-13 ENCOUNTER — ANESTHESIA (OUTPATIENT)
Dept: SURGERY | Facility: OTHER | Age: 58
End: 2018-07-13
Payer: MEDICARE

## 2018-07-13 ENCOUNTER — HOSPITAL ENCOUNTER (OUTPATIENT)
Facility: OTHER | Age: 58
Discharge: HOME | End: 2018-07-13
Attending: PLASTIC SURGERY | Admitting: PLASTIC SURGERY
Payer: MEDICARE

## 2018-07-13 VITALS
WEIGHT: 122 LBS | HEIGHT: 62 IN | SYSTOLIC BLOOD PRESSURE: 120 MMHG | DIASTOLIC BLOOD PRESSURE: 77 MMHG | RESPIRATION RATE: 16 BRPM | HEART RATE: 81 BPM | OXYGEN SATURATION: 95 % | TEMPERATURE: 98 F | BODY MASS INDEX: 22.45 KG/M2

## 2018-07-13 DIAGNOSIS — M79.641 RIGHT HAND PAIN: ICD-10-CM

## 2018-07-13 DIAGNOSIS — G56.01 CARPAL TUNNEL SYNDROME ON RIGHT: ICD-10-CM

## 2018-07-13 PROCEDURE — 25000003 PHARM REV CODE 250: Performed by: ANESTHESIOLOGY

## 2018-07-13 PROCEDURE — 37000008 HC ANESTHESIA 1ST 15 MINUTES: Performed by: PLASTIC SURGERY

## 2018-07-13 PROCEDURE — 37000009 HC ANESTHESIA EA ADD 15 MINS: Performed by: PLASTIC SURGERY

## 2018-07-13 PROCEDURE — 63600175 PHARM REV CODE 636 W HCPCS: Performed by: PLASTIC SURGERY

## 2018-07-13 PROCEDURE — 36000707: Performed by: PLASTIC SURGERY

## 2018-07-13 PROCEDURE — 63600175 PHARM REV CODE 636 W HCPCS: Performed by: NURSE ANESTHETIST, CERTIFIED REGISTERED

## 2018-07-13 PROCEDURE — 71000015 HC POSTOP RECOV 1ST HR: Performed by: PLASTIC SURGERY

## 2018-07-13 PROCEDURE — 71000016 HC POSTOP RECOV ADDL HR: Performed by: PLASTIC SURGERY

## 2018-07-13 PROCEDURE — 64721 CARPAL TUNNEL SURGERY: CPT | Mod: RT,,, | Performed by: PLASTIC SURGERY

## 2018-07-13 PROCEDURE — 25000003 PHARM REV CODE 250: Performed by: PLASTIC SURGERY

## 2018-07-13 PROCEDURE — 36000706: Performed by: PLASTIC SURGERY

## 2018-07-13 RX ORDER — TRAMADOL HYDROCHLORIDE 50 MG/1
50 TABLET ORAL EVERY 4 HOURS PRN
Status: DISCONTINUED | OUTPATIENT
Start: 2018-07-13 | End: 2018-07-13 | Stop reason: HOSPADM

## 2018-07-13 RX ORDER — SODIUM CHLORIDE 0.9 % (FLUSH) 0.9 %
3 SYRINGE (ML) INJECTION
Status: DISCONTINUED | OUTPATIENT
Start: 2018-07-13 | End: 2018-07-13 | Stop reason: HOSPADM

## 2018-07-13 RX ORDER — FENTANYL CITRATE 50 UG/ML
25 INJECTION, SOLUTION INTRAMUSCULAR; INTRAVENOUS EVERY 5 MIN PRN
Status: DISCONTINUED | OUTPATIENT
Start: 2018-07-13 | End: 2018-07-13 | Stop reason: HOSPADM

## 2018-07-13 RX ORDER — DIPHENHYDRAMINE HYDROCHLORIDE 50 MG/ML
25 INJECTION INTRAMUSCULAR; INTRAVENOUS EVERY 6 HOURS PRN
Status: DISCONTINUED | OUTPATIENT
Start: 2018-07-13 | End: 2018-07-13 | Stop reason: HOSPADM

## 2018-07-13 RX ORDER — MIDAZOLAM HYDROCHLORIDE 1 MG/ML
INJECTION INTRAMUSCULAR; INTRAVENOUS
Status: DISCONTINUED | OUTPATIENT
Start: 2018-07-13 | End: 2018-07-13

## 2018-07-13 RX ORDER — SODIUM CHLORIDE 9 MG/ML
INJECTION, SOLUTION INTRAVENOUS CONTINUOUS
Status: DISCONTINUED | OUTPATIENT
Start: 2018-07-13 | End: 2018-07-13 | Stop reason: HOSPADM

## 2018-07-13 RX ORDER — TRAMADOL HYDROCHLORIDE 50 MG/1
50 TABLET ORAL EVERY 6 HOURS PRN
Qty: 30 TABLET | Refills: 0 | Status: SHIPPED | OUTPATIENT
Start: 2018-07-13 | End: 2018-07-23

## 2018-07-13 RX ORDER — CEFAZOLIN SODIUM 2 G/50ML
2 SOLUTION INTRAVENOUS
Status: COMPLETED | OUTPATIENT
Start: 2018-07-13 | End: 2018-07-13

## 2018-07-13 RX ORDER — BUPIVACAINE HYDROCHLORIDE 2.5 MG/ML
INJECTION, SOLUTION EPIDURAL; INFILTRATION; INTRACAUDAL
Status: DISCONTINUED | OUTPATIENT
Start: 2018-07-13 | End: 2018-07-13 | Stop reason: HOSPADM

## 2018-07-13 RX ORDER — LIDOCAINE HYDROCHLORIDE AND EPINEPHRINE 10; 10 MG/ML; UG/ML
INJECTION, SOLUTION INFILTRATION; PERINEURAL
Status: DISCONTINUED | OUTPATIENT
Start: 2018-07-13 | End: 2018-07-13 | Stop reason: HOSPADM

## 2018-07-13 RX ORDER — SODIUM CHLORIDE, SODIUM LACTATE, POTASSIUM CHLORIDE, CALCIUM CHLORIDE 600; 310; 30; 20 MG/100ML; MG/100ML; MG/100ML; MG/100ML
INJECTION, SOLUTION INTRAVENOUS CONTINUOUS
Status: DISCONTINUED | OUTPATIENT
Start: 2018-07-13 | End: 2018-07-13 | Stop reason: HOSPADM

## 2018-07-13 RX ORDER — MEPERIDINE HYDROCHLORIDE 50 MG/ML
12.5 INJECTION INTRAMUSCULAR; INTRAVENOUS; SUBCUTANEOUS ONCE AS NEEDED
Status: DISCONTINUED | OUTPATIENT
Start: 2018-07-13 | End: 2018-07-13 | Stop reason: HOSPADM

## 2018-07-13 RX ORDER — ONDANSETRON 2 MG/ML
4 INJECTION INTRAMUSCULAR; INTRAVENOUS DAILY PRN
Status: DISCONTINUED | OUTPATIENT
Start: 2018-07-13 | End: 2018-07-13 | Stop reason: HOSPADM

## 2018-07-13 RX ORDER — LIDOCAINE HYDROCHLORIDE 10 MG/ML
0.5 INJECTION, SOLUTION EPIDURAL; INFILTRATION; INTRACAUDAL; PERINEURAL ONCE
Status: DISCONTINUED | OUTPATIENT
Start: 2018-07-13 | End: 2018-07-13 | Stop reason: HOSPADM

## 2018-07-13 RX ORDER — SODIUM CHLORIDE 0.9 % (FLUSH) 0.9 %
5 SYRINGE (ML) INJECTION
Status: DISCONTINUED | OUTPATIENT
Start: 2018-07-13 | End: 2018-07-13 | Stop reason: HOSPADM

## 2018-07-13 RX ADMIN — MIDAZOLAM HYDROCHLORIDE 2 MG: 1 INJECTION, SOLUTION INTRAMUSCULAR; INTRAVENOUS at 07:07

## 2018-07-13 RX ADMIN — SODIUM CHLORIDE, SODIUM LACTATE, POTASSIUM CHLORIDE, AND CALCIUM CHLORIDE: 600; 310; 30; 20 INJECTION, SOLUTION INTRAVENOUS at 06:07

## 2018-07-13 RX ADMIN — CEFAZOLIN SODIUM 2 G: 2 SOLUTION INTRAVENOUS at 07:07

## 2018-07-13 NOTE — BRIEF OP NOTE
[facility]  Brief Operative Note     SUMMARY     Surgery Date: 7/13/2018     Surgeon(s) and Role:     * Fransisco Jin Jr., MD - Primary    Assisting Surgeon: None    Pre-op Diagnosis:  Carpal tunnel syndrome on right [G56.01]    Post-op Diagnosis:  Post-Op Diagnosis Codes:     * Carpal tunnel syndrome on right [G56.01]    Procedure(s) (LRB):  RELEASE-CARPAL TUNNEL (Right)    Anesthesia: Local MAC    Description of the findings of the procedure: tight right carpal tunnel      Findings/Key Components: same    Estimated Blood Loss: * No values recorded between 7/13/2018  7:33 AM and 7/13/2018  7:51 AM *         Specimens:   Specimen (12h ago through future)    None          Discharge Note    SUMMARY     Admit Date: 7/13/2018    Discharge Date and Time:  07/13/2018 7:54 AM    Hospital Course (synopsis of major diagnoses, care, treatment, and services provided during the course of the hospital stay): pt admitted for outpatient surgery     Final Diagnosis: Post-Op Diagnosis Codes:     * Carpal tunnel syndrome on right [G56.01]    Disposition: Home or Self Care    Follow Up/Patient Instructions:  Follow up in hand clinic in 2 weeks for suture removal    Medications:  Reconciled Home Medications:      Medication List      START taking these medications    traMADol 50 mg tablet  Commonly known as:  ULTRAM  Take 1 tablet (50 mg total) by mouth every 6 (six) hours as needed for Pain.        CONTINUE taking these medications    atorvastatin 20 MG tablet  Commonly known as:  LIPITOR  Take 20 mg by mouth every evening.     cyanocobalamin 1000 MCG tablet  Commonly known as:  VITAMIN B-12  Take 100 mcg by mouth once daily.     * fluticasone 110 mcg/actuation inhaler  Commonly known as:  FLOVENT HFA  Inhale 1 puff into the lungs 2 (two) times daily. Controller     * fluticasone 50 mcg/actuation nasal spray  Commonly known as:  FLONASE  2 sprays by Each Nare route once daily.     gabapentin 300 MG  capsule  Commonly known as:  NEURONTIN  3 po (900mg) tid     OXcarbazepine 300 MG Tab  Commonly known as:  TRILEPTAL  Take 150 mg by mouth 2 (two) times daily.     pantoprazole 40 MG tablet  Commonly known as:  PROTONIX  Take 40 mg by mouth once daily.     QUEtiapine 50 MG tablet  Commonly known as:  SEROQUEL  Take 50 mg by mouth every evening.     VENTOLIN HFA 90 mcg/actuation inhaler  Generic drug:  albuterol  Inhale 2 puffs into the lungs every 6 (six) hours as needed for Wheezing. Rescue        * This list has 2 medication(s) that are the same as other medications prescribed for you. Read the directions carefully, and ask your doctor or other care provider to review them with you.                Discharge Procedure Orders  Diet general     Call MD for:  temperature >100.4     Call MD for:  persistent nausea and vomiting     Call MD for:  severe uncontrolled pain     Call MD for:  difficulty breathing, headache or visual disturbances     Call MD for:  redness, tenderness, or signs of infection (pain, swelling, redness, odor or green/yellow discharge around incision site)     Call MD for:  hives     Call MD for:  persistent dizziness or light-headedness     Call MD for:  extreme fatigue     Keep surgical extremity elevated     Lifting restrictions     No driving, operating heavy equipment or signing legal documents while taking pain medication.     Wound care routine (specify)   Order Comments: Remove splint and dressing in one week, then cover incision with bandaids.       Follow-up Information     Fransisco Jin Jr, MD In 2 weeks.    Specialties:  Plastic Surgery, Hand Surgery  Why:  For suture removal  Contact information:  [address] [address]  [phone]

## 2018-07-13 NOTE — DISCHARGE INSTRUCTIONS
Anesthesia: Monitored Anesthesia Care (MAC)    Anesthesia Safety  · Have an adult family member or friend drive you home after the procedure.  · For the first 24 hours after your surgery:  ¨ Do not drive or use heavy equipment.  ¨ Do not make important decisions or sign documents.  ¨ Avoid alcohol.  ¨ Have someone stay with you, if possible. They can watch for problems and help keep you safe.    Follow carpal tunnel release instructions for recovery per

## 2018-07-13 NOTE — ANESTHESIA POSTPROCEDURE EVALUATION
"Anesthesia Post Evaluation    Patient: Alayna Anderson    Procedure(s) Performed: Procedure(s) (LRB):  RELEASE-CARPAL TUNNEL (Right)    Final Anesthesia Type: MAC  Patient location during evaluation: OPS  Patient participation: Yes- Able to Participate  Level of consciousness: awake and alert and oriented  Post-procedure vital signs: reviewed and stable  Pain management: adequate  Airway patency: patent  PONV status at discharge: No PONV  Anesthetic complications: no      Cardiovascular status: stable  Respiratory status: unassisted, spontaneous ventilation and room air  Hydration status: euvolemic  Follow-up not needed.        Visit Vitals  /72 (BP Location: Left arm, Patient Position: Lying)   Pulse 68   Temp 36.7 °C (98 °F)   Resp 16   Ht 5' 2" (1.575 m)   Wt 55.3 kg (122 lb)   SpO2 95%   BMI 22.31 kg/m²       Pain/Jose Alberto Score: Presence of Pain: miguel angel (7/13/2018  6:27 AM)      "

## 2018-07-13 NOTE — OP NOTE
DATE OF PROCEDURE:  07/13/2018.    PREOPERATIVE DIAGNOSIS:  Right carpal tunnel syndrome.    POSTOPERATIVE DIAGNOSIS:  Right carpal tunnel syndrome.    PROCEDURE:  Right open carpal tunnel release.  Application of right short arm splint    SURGEON:  Fransisco Jin Jr., M.D.    ANESTHESIA:  Local with MAC.    FLUIDS:  300 mL crystalloid.    ESTIMATED BLOOD LOSS:  Minimal.    SPECIMENS:  None.    FINDINGS:  Tight transverse retinacular ligament of the right carpal tunnel.    SPECIMENS:  None.    IMPLANTS AND GRAFTS:  None.    COMPLICATIONS:  None.    DISPOSITION:  To PACU, then home.    INDICATIONS:  Ms. Anderson is a 57-year-old right-hand dominant female who was   diagnosed with right carpal tunnel syndrome.  She underwent conservative   treatment and ultimately failed steroid injections.  It was discussed performing   an open carpal tunnel release to help alleviate her symptoms.  We discussed the   risks, benefits and alternatives in detail and the patient wished to proceed   and informed consent was obtained and the patient was then scheduled for the   procedure.    PROCEDURE IN DETAIL:  After proper identification of Mrs. Anderson in the   preoperative area, the patient was wheeled to the Operating Room on hospital   stretcher.  Pressure points were padded and checked this time.  A timeout was   called when surgeons, nurses, and Anesthesia agreed upon the patient and   procedure.  She was given Ancef for prophylactic antibiotics.  She was induced   under MAC sedation.  After the patient was well sedated, padded 18-inch   tourniquet was then placed to her right upper extremity and the carpal tunnel   was injected with 10 mL of 1% lidocaine with epinephrine.  Next, the hand was   prepped and draped in usual sterile fashion.  An Esmarch was used to   exsanguinate the hand and tourniquet was inflated to 250 mmHg.  Longitudinal   incision was created over the carpal tunnel through the skin, dermis and   subcutaneous  tissue.  The palmaris fascia was then divided and the carpal tunnel   was then divided under direct visualization using a #15 blade scalpel with care   not to injure the underlying structures and antebrachial fasciotomy was   performed under direct visualization using Littler scissors in a distal proximal   direction.  The carpal tunnel was then inspected.  The nerve was in continuity   with no signs of injury.  It was then satisfied with release of the carpal   tunnel.  The tourniquet was then released at this time and hemostasis was   achieved using electrocautery.  The wound was then irrigated out with copious   amounts of normal saline and closed using 4-0 nylon sutures in interrupted   fashion.  The wound was then cleaned and dried.  A 0.25% Marcaine was injected   circumferentially around the incision for postoperative anesthesia.  Xeroform   was then applied followed by dry sterile splint with the wrist in neutral and   fingers free.  This concluded the procedure.  The patient tolerated the   procedure well without any complications.  At the end of the case, needle and   sponge counts were correct x2 and I was present and scrubbed all aspects of   procedure.  She was then awoken from MAC anesthesia and taken to PACU for   further recovery.      KOBE/ELZBIETA  dd: 07/13/2018 07:58:20 (CDT)  td: 07/13/2018 08:21:46 (CDT)  Doc ID   #[id number]  Job ID #[id number]    CC:

## 2018-07-13 NOTE — H&P
Chief Complaint: No chief complaint on file.      HPI:    Alayna Anderson is a57 y.o. female presenting today for right carpal tunnel release, no changes in medical history    Past Medical History:   Diagnosis Date    Alcohol abuse     Arthritis     Asthma     Bipolar disorder     Cancer     cervical    COPD (chronic obstructive pulmonary disease)     Disc degeneration     Diverticulitis     Emphysema lung     Foot fracture     left    Homelessness     Seizures     Tobacco abuse        Past Surgical History:   Procedure Laterality Date    CERVIX SURGERY      EYE SURGERY Right     cataract    TONSILLECTOMY      TUBAL LIGATION          Family History   Problem Relation Age of Onset    Stroke Mother        Social History     Social History    Marital status:      Spouse name: N/A    Number of children: N/A    Years of education: N/A     Social History Main Topics    Smoking status: Current Every Day Smoker     Packs/day: 1.00     Years: 45.00    Smokeless tobacco: Former User     Types: Snuff, Chew     Quit date: 3/1/1965    Alcohol use No      Comment: stopped drinking a year ago    Drug use: Yes     Types: Cocaine      Comment: crack cocaine    QUIT in 1992    Sexual activity: Not Asked     Other Topics Concern    None     Social History Narrative    None       Review of patient's allergies indicates:   Allergen Reactions    Opioids - morphine analogues Swelling     Throat swelling         Current Facility-Administered Medications:     0.9%  NaCl infusion, , Intravenous, Continuous, Fransisco Jin Jr., MD    cefazolin (ANCEF) 2 gram in dextrose 5% 50 mL IVPB (premix), 2 g, Intravenous, On Call Procedure, Fransisco Jin Jr., MD    lactated ringers infusion, , Intravenous, Continuous, Bryce Foley MD    lidocaine (PF) 10 mg/ml (1%) injection 5 mg, 0.5 mL, Intradermal, Once, Bryce Foley MD    sodium chloride 0.9% flush 3 mL, 3 mL, Intravenous, PRN, Jignesh Price,  "MD    Facility-Administered Medications Ordered in Other Encounters:     midazolam injection, , , PRN, Stacy Esparza CRNA, 2 mg at 07/13/18 0702        Review of Systems:  Constitutional: no fever or chills  ENT: no nasal congestion or sore throat  Respiratory: no cough or shortness of breath  Cardiovascular: no chest pain or palpitations  Gastrointestinal: no nausea or vomiting, PUD, GERD, NSAID intolerance  Genitourinary: no hematuria or dysuria  Integument/Breast: no rash or pruritis  Hematologic/Lymphatic: no easy bruising or lymphadenopathy  Musculoskeletal: see HPI  Neurological: no seizures or tremors  Behavioral/Psych: no auditory or visual hallucinations      Objective:      PHYSICAL EXAM:  Vitals:    07/12/18 1100 07/13/18 0624   BP:  115/72   Pulse:  68   Resp:  16   Temp:  98 °F (36.7 °C)   SpO2:  95%   Weight: 55.3 kg (122 lb)    Height: 5' 2" (1.575 m)      General Appearance: WDWN, NAD  Neuro/Psych: Mood & affect appropriate  Lungs: Respirations equal and unlabored.   CV: No apparent CV dysfunction, distal pulses intact, RRR  Skin: Intact throughout  Extremities:   Right Hand Exam     Tenderness   The patient is experiencing no tenderness.         Tests   Phalens Sign: positive  Tinels Sign (Medial Nerve): positive    Other   Erythema: absent  Sensation: normal  Pulse: present                  Assessment:   Right carpal tunnel syndrome     Plan/Discussion:   To OR for right carpal tunnel release.   Consent on chart       "

## 2018-07-13 NOTE — OP NOTE
Dictation Confirmation Code: [id number]  Fransisco Jin Jr. MD  07/13/2018  7:55 AM      Dictation #1  MRN:[id number]  CSN:[id number]

## 2018-07-27 ENCOUNTER — OFFICE VISIT (OUTPATIENT)
Dept: ORTHOPEDICS | Facility: CLINIC | Age: 58
End: 2018-07-27
Payer: MEDICARE

## 2018-07-27 VITALS
HEIGHT: 62 IN | HEART RATE: 107 BPM | SYSTOLIC BLOOD PRESSURE: 128 MMHG | BODY MASS INDEX: 22.45 KG/M2 | RESPIRATION RATE: 18 BRPM | WEIGHT: 122 LBS | DIASTOLIC BLOOD PRESSURE: 70 MMHG

## 2018-07-27 DIAGNOSIS — Z98.890 STATUS POST SURGERY: Primary | ICD-10-CM

## 2018-07-27 PROCEDURE — 99024 POSTOP FOLLOW-UP VISIT: CPT | Mod: S$GLB,,, | Performed by: PLASTIC SURGERY

## 2018-07-27 PROCEDURE — 99999 PR PBB SHADOW E&M-EST. PATIENT-LVL III: CPT | Mod: PBBFAC,,, | Performed by: PLASTIC SURGERY

## 2018-07-27 NOTE — PROGRESS NOTES
"Ms. Anderson is here today for a post-operative visit.she is 14 days status post right carpal tunnel release . she reports that she is doing well.  Pain is doing well.  she is not taking pain medication . she denies fever, chills, and sweats since the time of the surgery.     Physical exam:    Vitals:    07/27/18 1500   BP: 128/70   Pulse: 107   Resp: 18   Weight: 55.3 kg (122 lb)   Height: 5' 2" (1.575 m)   PainSc: 0-No pain   PainLoc: Hand     Suture in place.  Incision is clean, dry and intact.  There is no erythema or exudate.  There is no sign of any infection. she is NVI.     Assessment: 14 days status post right CTR    Plan:  Alayna was seen today for pain.    Diagnoses and all orders for this visit:    Status post surgery        - sutures removed today may get it wet in the shower and use regular soap  - Continue Range of motion exercises   - Tylenol 500 mg and/or Ibuprofen 400mg every 4-6 hours with food for pain as tolerated  - - Follow up: prn    "

## 2018-09-06 ENCOUNTER — TELEPHONE (OUTPATIENT)
Dept: SLEEP MEDICINE | Facility: CLINIC | Age: 58
End: 2018-09-06

## 2018-12-04 DIAGNOSIS — R52 PAIN: Primary | ICD-10-CM

## 2019-01-15 DIAGNOSIS — Z87.891 PERSONAL HISTORY OF TOBACCO USE, PRESENTING HAZARDS TO HEALTH: Primary | ICD-10-CM

## 2019-01-21 DIAGNOSIS — M79.642 BILATERAL HAND PAIN: Primary | ICD-10-CM

## 2019-01-21 DIAGNOSIS — M79.641 BILATERAL HAND PAIN: Primary | ICD-10-CM

## 2019-01-23 ENCOUNTER — OFFICE VISIT (OUTPATIENT)
Dept: ORTHOPEDICS | Facility: CLINIC | Age: 59
End: 2019-01-23
Payer: MEDICARE

## 2019-01-23 ENCOUNTER — HOSPITAL ENCOUNTER (OUTPATIENT)
Dept: RADIOLOGY | Facility: OTHER | Age: 59
Discharge: HOME | End: 2019-01-23
Attending: PLASTIC SURGERY
Payer: MEDICARE

## 2019-01-23 VITALS
HEIGHT: 62 IN | SYSTOLIC BLOOD PRESSURE: 123 MMHG | HEART RATE: 66 BPM | WEIGHT: 122 LBS | DIASTOLIC BLOOD PRESSURE: 70 MMHG | BODY MASS INDEX: 22.45 KG/M2

## 2019-01-23 DIAGNOSIS — M79.641 BILATERAL HAND PAIN: ICD-10-CM

## 2019-01-23 DIAGNOSIS — M19.031 ARTHRITIS OF RIGHT WRIST: ICD-10-CM

## 2019-01-23 DIAGNOSIS — M79.642 BILATERAL HAND PAIN: ICD-10-CM

## 2019-01-23 DIAGNOSIS — G56.02 LEFT CARPAL TUNNEL SYNDROME: ICD-10-CM

## 2019-01-23 DIAGNOSIS — M18.11 ARTHRITIS OF CARPOMETACARPAL (CMC) JOINT OF RIGHT THUMB: Primary | ICD-10-CM

## 2019-01-23 PROCEDURE — 3008F BODY MASS INDEX DOCD: CPT | Mod: CPTII,S$GLB,, | Performed by: PLASTIC SURGERY

## 2019-01-23 PROCEDURE — 20600 PR DRAIN/INJECT SMALL JOINT/BURSA: ICD-10-PCS | Mod: 51,RT,S$GLB, | Performed by: PLASTIC SURGERY

## 2019-01-23 PROCEDURE — 3008F PR BODY MASS INDEX (BMI) DOCUMENTED: ICD-10-PCS | Mod: CPTII,S$GLB,, | Performed by: PLASTIC SURGERY

## 2019-01-23 PROCEDURE — 20605 PR DRAIN/INJECT INTERMEDIATE JOINT/BURSA: ICD-10-PCS | Mod: RT,S$GLB,, | Performed by: PLASTIC SURGERY

## 2019-01-23 PROCEDURE — 73130 X-RAY EXAM OF HAND: CPT | Mod: 50,TC,FY

## 2019-01-23 PROCEDURE — 73130 XR HAND COMPLETE 3 VIEWS BILATERAL: ICD-10-PCS | Mod: 26,50,, | Performed by: RADIOLOGY

## 2019-01-23 PROCEDURE — 20605 DRAIN/INJ JOINT/BURSA W/O US: CPT | Mod: RT,S$GLB,, | Performed by: PLASTIC SURGERY

## 2019-01-23 PROCEDURE — 20600 DRAIN/INJ JOINT/BURSA W/O US: CPT | Mod: 51,RT,S$GLB, | Performed by: PLASTIC SURGERY

## 2019-01-23 PROCEDURE — 99213 OFFICE O/P EST LOW 20 MIN: CPT | Mod: 25,S$GLB,, | Performed by: PLASTIC SURGERY

## 2019-01-23 PROCEDURE — 73130 X-RAY EXAM OF HAND: CPT | Mod: 26,50,, | Performed by: RADIOLOGY

## 2019-01-23 PROCEDURE — 99999 PR PBB SHADOW E&M-EST. PATIENT-LVL III: CPT | Mod: PBBFAC,,, | Performed by: PLASTIC SURGERY

## 2019-01-23 PROCEDURE — 99999 PR PBB SHADOW E&M-EST. PATIENT-LVL III: ICD-10-PCS | Mod: PBBFAC,,, | Performed by: PLASTIC SURGERY

## 2019-01-23 PROCEDURE — 99213 PR OFFICE/OUTPT VISIT, EST, LEVL III, 20-29 MIN: ICD-10-PCS | Mod: 25,S$GLB,, | Performed by: PLASTIC SURGERY

## 2019-01-23 RX ORDER — TRIAMCINOLONE ACETONIDE 40 MG/ML
60 INJECTION, SUSPENSION INTRA-ARTICULAR; INTRAMUSCULAR
Status: COMPLETED | OUTPATIENT
Start: 2019-01-23 | End: 2019-01-23

## 2019-01-23 RX ADMIN — TRIAMCINOLONE ACETONIDE 60 MG: 40 INJECTION, SUSPENSION INTRA-ARTICULAR; INTRAMUSCULAR at 12:01

## 2019-01-23 NOTE — PROGRESS NOTES
"HISTORY OF PRESENT ILLNESS:  Ms. Anderson returns to the clinic complaining of   right hand and wrist pain.  She also complains of occasional numbness and   tingling in the left hand.  She states that she does have some swelling on the   dorsal aspect of her wrist as well, is beginning to affect some of her daily   activities such as gripping, twisting and holding objects.  She denies any   recent history of trauma and no other complaints.    PHYSICAL EXAMINATION:  VITAL SIGNS:  /70   Pulse 66   Ht 5' 2" (1.575 m)   Wt 55.3 kg (122 lb)   BMI 22.31 kg/m²     GENERAL:  No acute distress, alert and oriented x3, cooperative, well nourished.  EXTREMITIES:  Right upper extremity, there is some swelling on the dorsal aspect   of the radiocarpal joint.  Tenderness to deep compression in the snuffbox and   over the radiocarpal joints.  She has tenderness with compression of the CMC   joint with some moderate deformities.  She is able to make a composite fist.    She has sensation intact in the median, radial, and ulnar effusions.  Left upper   extremity, positive Tinel's and Durkan sign over the carpal tunnel.  Tenderness   to compression over the CMC joint at the base of the thumb.    RADIOLOGY IMPRESSION:  EXAMINATION:  XR HAND COMPLETE 3 VIEWS BILATERAL    CLINICAL HISTORY:  . Pain in right hand    TECHNIQUE:  PA, lateral, and oblique views of both hands were performed.    COMPARISON:  Plain for right hand from 05/16/2018    FINDINGS:  Right hand: Degenerative changes of the base of the thumb.  Joint space narrowing of the radiocarpal joint.  Remaining joint spaces are satisfactorily maintained.  No evidence of fracture or dislocation.  No erosions.  No radiopaque foreign body.    Left hand: Moderate degenerative changes of the base of thumb.  Joint space narrowing of the radiocarpal joint.  Remaining joint spaces are maintained.  No evidence of fracture or dislocation.  No evidence of erosions.  No radiopaque " foreign body.  ASSESSMENT:  1.  Right wrist arthritis.  2.  Right carpometacarpal joint arthritis.  3.  Left carpal tunnel syndrome.  4.  Left carpometacarpal joint arthritis.    PROCEDURES:  1.I have explained the risks, benefits, and alternatives of the procedure in detail.  The patient voices understanding and all questions have been answered.  The patient agrees to proceed as planned. After a sterile prep of the skin the right radiocarpal joint was injected from the dorsal approach using a 25 gauge needle with a combination of 1cc 1% plain xylocaine and 40 mg of Kenalog.  The patient is cautioned and immediate relief of pain is secondary to the local anesthetic and will be temporary.  After the anesthetic wears off there may be a increase in pain that may last for a few hours or a few days and they should use ice to help alleviate this flair up of pain.     2.  I have explained the risks, benefits, and alternatives of the procedure in detail.  The patient voices understanding and all questions have been answered.  The patient agrees to proceed as planned. After a sterile prep of the skin the right thumb CMC joint was injected from the dorsal approach using a 25 gauge needle with a combination of 1cc 1% plain xylocaine and 20 mg of Kenalog.  The patient is cautioned and immediate relief of pain is secondary to the local anesthetic and will be temporary.  After the anesthetic wears off there may be a increase in pain that may last for a few hours or a few days and they should use ice to help alleviate this flair up of pain.         PLAN:  Based on today's findings, the patient appears to be developing arthritis   within the radiocarpal joint as well as the base of both thumbs.  I discussed   the pathophysiology, progression and treatment of degenerative arthritis with   the patient in detail.  We discussed conservative treatment options, which   include rest, NSAIDs, splinting and corticosteroid injections.  The  patient   wished to receive an injection to the right wrist and to the base of the right   thumb due to the fact that this is the area where she is having most concern.  I   discussed that I would like to see her back if her symptoms fail to improve or   worsen.  All questions and concerns were addressed prior to discharge.      KOBE/ELZBIETA  dd: 01/23/2019 11:49:53 (CST)  td: 01/24/2019 06:49:23 (CST)  Doc ID   #[id number]  Job ID #[id number]    CC:       Dictation Confirmation Code: [id number]  Fransisco Jin Jr. MD  01/23/2019  11:42 AM

## 2019-01-25 ENCOUNTER — OFFICE VISIT (OUTPATIENT)
Dept: SPINE | Facility: CLINIC | Age: 59
End: 2019-01-25
Payer: MEDICARE

## 2019-01-25 VITALS
DIASTOLIC BLOOD PRESSURE: 99 MMHG | WEIGHT: 145 LBS | HEIGHT: 62 IN | HEART RATE: 89 BPM | BODY MASS INDEX: 26.68 KG/M2 | SYSTOLIC BLOOD PRESSURE: 145 MMHG

## 2019-01-25 DIAGNOSIS — G89.29 CHRONIC BILATERAL LOW BACK PAIN WITH BILATERAL SCIATICA: ICD-10-CM

## 2019-01-25 DIAGNOSIS — M54.17 THORACIC AND LUMBOSACRAL NEURITIS: ICD-10-CM

## 2019-01-25 DIAGNOSIS — M54.41 CHRONIC BILATERAL LOW BACK PAIN WITH BILATERAL SCIATICA: ICD-10-CM

## 2019-01-25 DIAGNOSIS — M54.14 THORACIC AND LUMBOSACRAL NEURITIS: ICD-10-CM

## 2019-01-25 DIAGNOSIS — M51.37 DDD (DEGENERATIVE DISC DISEASE), LUMBOSACRAL: Primary | ICD-10-CM

## 2019-01-25 DIAGNOSIS — M43.10 ACQUIRED SPONDYLOLISTHESIS: ICD-10-CM

## 2019-01-25 DIAGNOSIS — M54.42 CHRONIC BILATERAL LOW BACK PAIN WITH BILATERAL SCIATICA: ICD-10-CM

## 2019-01-25 PROCEDURE — 3008F BODY MASS INDEX DOCD: CPT | Mod: CPTII,S$GLB,, | Performed by: PHYSICIAN ASSISTANT

## 2019-01-25 PROCEDURE — 3008F PR BODY MASS INDEX (BMI) DOCUMENTED: ICD-10-PCS | Mod: CPTII,S$GLB,, | Performed by: PHYSICIAN ASSISTANT

## 2019-01-25 PROCEDURE — 99214 OFFICE O/P EST MOD 30 MIN: CPT | Mod: S$GLB,,, | Performed by: PHYSICIAN ASSISTANT

## 2019-01-25 PROCEDURE — 99999 PR PBB SHADOW E&M-EST. PATIENT-LVL IV: ICD-10-PCS | Mod: PBBFAC,,, | Performed by: PHYSICIAN ASSISTANT

## 2019-01-25 PROCEDURE — 99999 PR PBB SHADOW E&M-EST. PATIENT-LVL IV: CPT | Mod: PBBFAC,,, | Performed by: PHYSICIAN ASSISTANT

## 2019-01-25 PROCEDURE — 99214 PR OFFICE/OUTPT VISIT, EST, LEVL IV, 30-39 MIN: ICD-10-PCS | Mod: S$GLB,,, | Performed by: PHYSICIAN ASSISTANT

## 2019-01-25 RX ORDER — IBUPROFEN 800 MG/1
800 TABLET ORAL
Qty: 90 TABLET | Refills: 2 | Status: SHIPPED | OUTPATIENT
Start: 2019-01-25 | End: 2020-10-21 | Stop reason: SDUPTHER

## 2019-01-25 RX ORDER — GABAPENTIN 300 MG/1
CAPSULE ORAL
Qty: 270 CAPSULE | Refills: 2 | Status: SHIPPED | OUTPATIENT
Start: 2019-01-25 | End: 2019-08-01 | Stop reason: SDUPTHER

## 2019-01-25 RX ORDER — CYCLOBENZAPRINE HCL 10 MG
10 TABLET ORAL 3 TIMES DAILY PRN
Qty: 90 TABLET | Refills: 0 | Status: SHIPPED | OUTPATIENT
Start: 2019-01-25 | End: 2019-02-24

## 2019-01-25 NOTE — PROGRESS NOTES
Subjective:      Patient ID: Alayna Anderson is a 58 y.o. female.    Chief Complaint: Low-back Pain      HPI  (Kalyvas)    Known mild DDD/spondylosis C4-T1 with slip C7-T1 along with multilevel foraminal stenosis and mild central stenosis C5-C6/C7-T1. Recent EMG showed right CTS with no cervical radiculopathy. Also with known retrolisthesis L3-L4, mild diffuse DDD/spondylosis. History of ETOH abuse and cocaine use, bipolar, COPD, and seizure disorder. States she currently is not drinking and that last drug use was years ago.     [facility] to [facility] and given flexeril at her last visit.     She states she is finally sober (not drinking) for the first time in years. No current drug use. She ran out of her medications about 2 months ago and has been worse. She complains of constant LBP with bilateral leg pain/spasms that are constant (entire leg). LBP = leg pain. Right leg pain = left leg pain. Pain is worse at night. Some improvement with her sister's TENs unit. No other alleviating factors. She rates her pain as an 8 on a scale of 1-10. Pain is sharp in nature. She was on neurontin 900mg tid, flexeril prn, and motrin prn. Ran out of them 2 months ago. Did not go to [facility].        Review of Systems   Constitution: Negative for chills, fever, night sweats and weight gain.   Gastrointestinal: Negative for bowel incontinence, nausea and vomiting.   Genitourinary: Negative for bladder incontinence.   Neurological: Negative for disturbances in coordination and loss of balance.           Objective:        General: Alayna is well-developed, well-nourished, appears stated age, in no acute distress, alert and oriented to time, place and person.     Ortho/SPM Exam     Patient sits comfortably in the exam room and answers questions appropriately. Grossly patient is able to move bilateral UEs/LEs without difficulty. Ambulates with walker.     She has diffuse lower lumbar tenderness.     Strength testing of  the bilateral LEs shows  Right hip abduction:  +5/5  Left hip abduction:  +5/5  Right hip flexion:  +5/5   Left hip flexion:  +5/5  Right hip extensors:  +5/5  Left hip extensors:  +5/5  Right quadriceps:  +5/5  Left quadriceps:  +5/5  Right hamstring:  +5/5  Left hamstring:  +5/5  Right dorsiflexion:   +5/5  Left dorsiflexion:  +5/5  Right plantar flexion:  +5/5  Left plantar flexion:  +5/5   Right EHL:  +5/5   Left EHL:  +5/5    Sensation is grossly intact to light touch in bilateral LEs.     Negative SLR bilaterally.         Assessment:       1. DDD (degenerative disc disease), lumbosacral    2. Thoracic and lumbosacral neuritis    3. Acquired spondylolisthesis    4. Chronic bilateral low back pain with bilateral sciatica           Plan:       Orders Placed This Encounter    Ambulatory consult to [facility]    gabapentin (NEURONTIN) 300 MG capsule    cyclobenzaprine (FLEXERIL) 10 MG tablet    ibuprofen (ADVIL,MOTRIN) 800 MG tablet       She states she is finally sober (not drinking) for the first time in years. No current drug use. She ran out of her medications about 2 months ago and has been worse. She complains of constant LBP with bilateral leg pain/spasms that are constant (entire leg). LBP = leg pain. Right leg pain = left leg pain. Known retrolisthesis L3-L4, mild diffuse DDD/spondylosis. History of ETOH abuse and cocaine use, bipolar, COPD, and seizure disorder. Treatment options reviewed with patient and following plan made:     - Restart neurontin and slowly get up to 900mg tid. Given instructions. Reviewed dosing and side effects.   - Restart prn flexeril and motrin. Refills given.   - Referral to [facility].   - TENs unit from [facility] for home use.   - If no improvement with above, will need to update imaging.     Follow-up: Follow-up in 3 months (on 4/25/2019). If there are any questions prior to this, the patient was instructed to contact the office.

## 2019-01-28 ENCOUNTER — PATIENT MESSAGE (OUTPATIENT)
Dept: SPINE | Facility: CLINIC | Age: 59
End: 2019-01-28

## 2019-01-29 NOTE — TELEPHONE ENCOUNTER
Please call pharmacy and see if they got her neurontin prescription. If not, please call it in. Please let patient know when this is done.

## 2019-03-25 ENCOUNTER — CLINICAL SUPPORT (OUTPATIENT)
Dept: SMOKING CESSATION | Facility: CLINIC | Age: 59
End: 2019-03-25
Payer: COMMERCIAL

## 2019-03-25 DIAGNOSIS — F17.210 VERY HEAVY CIGARETTE SMOKER (40 OR MORE PER DAY): Primary | ICD-10-CM

## 2019-03-25 PROCEDURE — 99999 PR PBB SHADOW E&M-EST. PATIENT-LVL I: ICD-10-PCS | Mod: PBBFAC,,,

## 2019-03-25 PROCEDURE — 99999 PR PBB SHADOW E&M-EST. PATIENT-LVL I: CPT | Mod: PBBFAC,,,

## 2019-03-25 PROCEDURE — 99404 PREV MED CNSL INDIV APPRX 60: CPT | Mod: S$GLB,,,

## 2019-03-25 PROCEDURE — 99404 PR PREVENT COUNSEL,INDIV,60 MIN: ICD-10-PCS | Mod: S$GLB,,,

## 2019-03-25 NOTE — Clinical Note
Patient was seen for tobacco cessation intake.  She will begin on 42 mg nicotine patches daily.  She is a current 3-4ppd smoker.  Patient will follow up in clinic next week.

## 2019-03-28 RX ORDER — IBUPROFEN 200 MG
TABLET ORAL
Qty: 28 PATCH | Refills: 0 | Status: SHIPPED | OUTPATIENT
Start: 2019-03-28 | End: 2019-04-05 | Stop reason: DRUGHIGH

## 2019-04-05 ENCOUNTER — CLINICAL SUPPORT (OUTPATIENT)
Dept: SMOKING CESSATION | Facility: CLINIC | Age: 59
End: 2019-04-05
Payer: COMMERCIAL

## 2019-04-05 DIAGNOSIS — F17.210 HEAVY CIGARETTE SMOKER (20-39 PER DAY): Primary | ICD-10-CM

## 2019-04-05 PROCEDURE — 99404 PREV MED CNSL INDIV APPRX 60: CPT | Mod: S$GLB,,,

## 2019-04-05 PROCEDURE — 99999 PR PBB SHADOW E&M-EST. PATIENT-LVL I: ICD-10-PCS | Mod: PBBFAC,,,

## 2019-04-05 PROCEDURE — 99404 PR PREVENT COUNSEL,INDIV,60 MIN: ICD-10-PCS | Mod: S$GLB,,,

## 2019-04-05 PROCEDURE — 99999 PR PBB SHADOW E&M-EST. PATIENT-LVL I: CPT | Mod: PBBFAC,,,

## 2019-04-05 RX ORDER — IBUPROFEN 200 MG
TABLET ORAL
Qty: 14 PATCH | Refills: 0 | Status: SHIPPED | OUTPATIENT
Start: 2019-04-05 | End: 2020-03-26 | Stop reason: SDUPTHER

## 2019-04-05 NOTE — PROGRESS NOTES
Individual Follow-Up Form    4/5/2019    Quit Date:     Clinical Status of Patient: Outpatient    Length of Service: 60 minutes    Continuing Medication: no    Other Medications: none     Target Symptoms: Withdrawal and medication side effects. The following were  rated moderate (3) to severe (4) on TCRS:  · Moderate (3): none  · Severe (4): none    Comments: Patient is smoking 1.5 packs per day.   Patient will rate fade to 15 cpd this week. We discussed tobacco triggers, cues, reasons and timeline to quitting.  Patient seem to have trouble staying focus on topics discussed.  We discussed tobacco cessation strategies.    Patient states the pharmacist did not have her RX.  I called pharmacy who states the RX is saved in her profile.  Cancelled  RX and sent to [facility] pharmacy.  Patient will begin on 35 mg nicotine patches this week.  The patient denies any abnormal behavioral or mental changes at this time. The patient will continue with group therapy sessions and medication monitoring by CTTS. Prescribed medication management will be by physician.         Diagnosis: F17.210    Next Visit: 1 week

## 2019-04-05 NOTE — Clinical Note
Patient is smoking 1.5 packs per day.   Patient will rate fade to 15 cpd this week. We discussed tobacco triggers, cues, reasons and timeline to quitting.  Patient seem to have trouble staying focus on topics discussed.  We discussed tobacco cessation strategies.    Patient states the pharmacist did not have her RX.  I called pharmacy who states the RX is saved in her profile.  Cancelled  RX and sent to [facility] pharmacy.  Patient will begin on 35 mg nicotine patches this week.  The patient denies any abnormal behavioral or mental changes at this time. The patient will continue with group therapy sessions and medication monitoring by CTTS. Prescribed medication management will be by physician.

## 2019-04-25 ENCOUNTER — TELEPHONE (OUTPATIENT)
Dept: SMOKING CESSATION | Facility: CLINIC | Age: 59
End: 2019-04-25

## 2019-04-25 ENCOUNTER — CLINICAL SUPPORT (OUTPATIENT)
Dept: SMOKING CESSATION | Facility: CLINIC | Age: 59
End: 2019-04-25
Payer: COMMERCIAL

## 2019-04-25 DIAGNOSIS — F17.210 MODERATE CIGARETTE SMOKER (10-19 PER DAY): Primary | ICD-10-CM

## 2019-04-25 PROCEDURE — 99406 BEHAV CHNG SMOKING 3-10 MIN: CPT | Mod: S$GLB,,,

## 2019-04-25 PROCEDURE — 99999 PR PBB SHADOW E&M-EST. PATIENT-LVL I: CPT | Mod: PBBFAC,,,

## 2019-04-25 PROCEDURE — 99999 PR PBB SHADOW E&M-EST. PATIENT-LVL I: ICD-10-PCS | Mod: PBBFAC,,,

## 2019-04-25 PROCEDURE — 99406 PR TOBACCO USE CESSATION INTERMEDIATE 3-10 MINUTES: ICD-10-PCS | Mod: S$GLB,,,

## 2019-05-21 ENCOUNTER — HOSPITAL ENCOUNTER (OUTPATIENT)
Dept: RADIOLOGY | Facility: OTHER | Age: 59
Discharge: HOME | End: 2019-05-21
Attending: INTERNAL MEDICINE
Payer: MEDICARE

## 2019-05-21 DIAGNOSIS — Z87.891 PERSONAL HISTORY OF TOBACCO USE, PRESENTING HAZARDS TO HEALTH: ICD-10-CM

## 2019-05-21 PROCEDURE — 71250 CT THORAX DX C-: CPT | Mod: TC

## 2019-05-21 PROCEDURE — 71250 CT CHEST WITHOUT CONTRAST: ICD-10-PCS | Mod: 26,,, | Performed by: RADIOLOGY

## 2019-05-21 PROCEDURE — 71250 CT THORAX DX C-: CPT | Mod: 26,,, | Performed by: RADIOLOGY

## 2019-07-31 NOTE — PROGRESS NOTES
Subjective:      Patient ID: Alayna Anderson is a 58 y.o. female.    Chief Complaint: Low-back Pain and Neck Pain      HPI  (Kalyvas)    Known mild DDD/spondylosis C4-T1 with slip C7-T1 along with multilevel foraminal stenosis and mild central stenosis C5-C6/C7-T1. Recent EMG showed right CTS with no cervical radiculopathy. Also with known retrolisthesis L3-L4, mild diffuse DDD/spondylosis. History of ETOH abuse and cocaine use, bipolar, COPD, and seizure disorder. Was in ED with acute ETOH intoxication in February.     Sent to [facility] at last visit and started back on flexeril, motrin, and neurontin.     LBP > neck pain.     She did not make it to [facility]. She had a fall a few weeks ago into a concrete wall. She fractured her right wrist and her nose. She complains of constant neck pain with no arm pain. Pain is worse with activity and better with medications. No numbness, tingling, or weakness. She rates her pain as a 9 on a scale of 1-10.     Also with constant LBP with bilateral leg pain (entire leg). LBP = leg pain. Right leg pain = left leg pain. Pain is worse at night. Some improvement with her TENs unit and medications. She rates her pain as an 9 on a scale of 1-10. Pain is sharp in nature. She is taking neurontin 900mg tid, flexeril prn, and motrin prn. Needs refills on neurontin and flexeril.     No drinking since February. No drug use.        Review of Systems   Constitution: Negative for chills, fever, night sweats and weight gain.   Gastrointestinal: Negative for bowel incontinence, nausea and vomiting.   Genitourinary: Negative for bladder incontinence.   Neurological: Negative for disturbances in coordination and loss of balance.           Objective:        General: Alayna is well-developed, well-nourished, appears stated age, in no acute distress, alert and oriented to time, place and person.     Ortho/SPM Exam     Patient sits comfortably in the exam room and answers questions  appropriately. Grossly patient is able to move bilateral UEs/LEs without difficulty. Normal gait.     Mild posterior cervical tenderness. Reasonable ROM of cervical spine.     Strength Testing of Bilateral UEs shows  Right :  +5/5   Left :  +5/5  Right deltoid:  +5/5   Left deltoid:  +5/5  Right biceps:  +5/5   Left biceps:  +5/5  Right triceps:  +5/5   Left triceps:  +5/5  Right wrist extension:  Not tested Left wrist extension:  +5/5  Right wrist flexion:  Not tested Left wrist flexion:  +5/5  Right interosseus:  +5/5  Left interosseus:  +5/5    She has right velcro wrist brace on.     She has diffuse lower lumbar tenderness.     Strength testing of the bilateral LEs shows  Right hip abduction:  +5/5  Left hip abduction:  +5/5  Right hip flexion:  +5/5   Left hip flexion:  +5/5  Right hip extensors:  +5/5  Left hip extensors:  +5/5  Right quadriceps:  +5/5  Left quadriceps:  +5/5  Right hamstring:  +5/5  Left hamstring:  +5/5  Right dorsiflexion:   +5/5  Left dorsiflexion:  +5/5  Right plantar flexion:  +5/5  Left plantar flexion:  +5/5   Right EHL:  +5/5   Left EHL:  +5/5    Sensation is grossly intact to light touch in bilateral LEs.     Negative SLR bilaterally.         Assessment:       1. Neck pain    2. Cervical spondylosis without myelopathy    3. Degeneration of cervical intervertebral disc    4. Cervical stenosis of spinal canal    5. Acquired spondylolisthesis    6. Chronic bilateral low back pain with bilateral sciatica    7. Thoracic and lumbosacral neuritis    8. DDD (degenerative disc disease), lumbosacral           Plan:       Orders Placed This Encounter    X-Ray Cervical Spine AP Lat with Flexion  Extension    X-Ray Lumbar Spine Ap Lateral w/Flex Ext    Ambulatory consult to [facility]    gabapentin (NEURONTIN) 300 MG capsule    cyclobenzaprine (FLEXERIL) 10 MG tablet       LBP > neck pain.     She had a fall a few weeks ago into a concrete wall. She fractured her right  wrist and her nose. She complains of constant neck pain with no arm pain. Known mild DDD/spondylosis C4-T1 with slip C7-T1 along with multilevel foraminal stenosis and mild central stenosis C5-C6/C7-T1.    Also with constant LBP with bilateral leg pain (entire leg). LBP = leg pain. Right leg pain = left leg pain. Known retrolisthesis L3-L4, mild diffuse DDD/spondylosis. History of ETOH abuse and cocaine use, bipolar, COPD, and seizure disorder. Treatment options reviewed with patient and following plan made:     - Continue neurontin 900mg tid, flexeril prn, and motrin prn with food. Refills on flexeril and neurontin.   - Referral to [facility] program. Will do lumbar spine first.   - Cervical and lumbar XRs on her way out. Will call her with results. [phone]  - Continue with prn TENs unit.     Follow-up: Follow up in about 3 months (around 11/1/2019). If there are any questions prior to this, the patient was instructed to contact the office.

## 2019-08-01 ENCOUNTER — OFFICE VISIT (OUTPATIENT)
Dept: SPINE | Facility: CLINIC | Age: 59
End: 2019-08-01
Payer: MEDICARE

## 2019-08-01 ENCOUNTER — OFFICE VISIT (OUTPATIENT)
Dept: ORTHOPEDICS | Facility: CLINIC | Age: 59
End: 2019-08-01
Payer: MEDICARE

## 2019-08-01 ENCOUNTER — HOSPITAL ENCOUNTER (OUTPATIENT)
Dept: RADIOLOGY | Facility: OTHER | Age: 59
Discharge: HOME | End: 2019-08-01
Attending: PHYSICIAN ASSISTANT
Payer: MEDICARE

## 2019-08-01 ENCOUNTER — HOSPITAL ENCOUNTER (OUTPATIENT)
Dept: RADIOLOGY | Facility: OTHER | Age: 59
Discharge: HOME | End: 2019-08-01
Attending: PLASTIC SURGERY
Payer: MEDICARE

## 2019-08-01 VITALS
HEART RATE: 96 BPM | HEIGHT: 62 IN | WEIGHT: 145 LBS | BODY MASS INDEX: 26.68 KG/M2 | DIASTOLIC BLOOD PRESSURE: 62 MMHG | SYSTOLIC BLOOD PRESSURE: 129 MMHG

## 2019-08-01 VITALS
HEIGHT: 62 IN | DIASTOLIC BLOOD PRESSURE: 72 MMHG | WEIGHT: 145.06 LBS | HEART RATE: 80 BPM | BODY MASS INDEX: 26.69 KG/M2 | SYSTOLIC BLOOD PRESSURE: 150 MMHG

## 2019-08-01 DIAGNOSIS — M50.30 DEGENERATION OF CERVICAL INTERVERTEBRAL DISC: ICD-10-CM

## 2019-08-01 DIAGNOSIS — M51.37 DDD (DEGENERATIVE DISC DISEASE), LUMBOSACRAL: ICD-10-CM

## 2019-08-01 DIAGNOSIS — M54.41 CHRONIC BILATERAL LOW BACK PAIN WITH BILATERAL SCIATICA: ICD-10-CM

## 2019-08-01 DIAGNOSIS — G89.29 CHRONIC BILATERAL LOW BACK PAIN WITH BILATERAL SCIATICA: ICD-10-CM

## 2019-08-01 DIAGNOSIS — M54.2 NECK PAIN: Primary | ICD-10-CM

## 2019-08-01 DIAGNOSIS — M48.02 CERVICAL STENOSIS OF SPINAL CANAL: ICD-10-CM

## 2019-08-01 DIAGNOSIS — M54.17 THORACIC AND LUMBOSACRAL NEURITIS: ICD-10-CM

## 2019-08-01 DIAGNOSIS — M54.14 THORACIC AND LUMBOSACRAL NEURITIS: ICD-10-CM

## 2019-08-01 DIAGNOSIS — M43.10 ACQUIRED SPONDYLOLISTHESIS: ICD-10-CM

## 2019-08-01 DIAGNOSIS — M25.531 RIGHT WRIST PAIN: ICD-10-CM

## 2019-08-01 DIAGNOSIS — M54.42 CHRONIC BILATERAL LOW BACK PAIN WITH BILATERAL SCIATICA: ICD-10-CM

## 2019-08-01 DIAGNOSIS — M47.812 CERVICAL SPONDYLOSIS WITHOUT MYELOPATHY: ICD-10-CM

## 2019-08-01 DIAGNOSIS — M25.531 RIGHT WRIST PAIN: Primary | ICD-10-CM

## 2019-08-01 DIAGNOSIS — M54.2 NECK PAIN: ICD-10-CM

## 2019-08-01 PROCEDURE — 72100 X-RAY EXAM L-S SPINE 2/3 VWS: CPT | Mod: TC,FY

## 2019-08-01 PROCEDURE — 3008F PR BODY MASS INDEX (BMI) DOCUMENTED: ICD-10-PCS | Mod: CPTII,S$GLB,, | Performed by: PHYSICIAN ASSISTANT

## 2019-08-01 PROCEDURE — 73110 XR WRIST COMPLETE 3 VIEWS RIGHT: ICD-10-PCS | Mod: 26,RT,, | Performed by: RADIOLOGY

## 2019-08-01 PROCEDURE — 73110 X-RAY EXAM OF WRIST: CPT | Mod: TC,FY,RT

## 2019-08-01 PROCEDURE — 3008F BODY MASS INDEX DOCD: CPT | Mod: CPTII,S$GLB,, | Performed by: PLASTIC SURGERY

## 2019-08-01 PROCEDURE — 72050 X-RAY EXAM NECK SPINE 4/5VWS: CPT | Mod: 26,,, | Performed by: RADIOLOGY

## 2019-08-01 PROCEDURE — 72050 XR CERVICAL SPINE AP LAT WITH FLEX EXTEN: ICD-10-PCS | Mod: 26,,, | Performed by: RADIOLOGY

## 2019-08-01 PROCEDURE — 99999 PR PBB SHADOW E&M-EST. PATIENT-LVL IV: CPT | Mod: PBBFAC,,, | Performed by: PHYSICIAN ASSISTANT

## 2019-08-01 PROCEDURE — 72100 XR LUMBAR SPINE AP AND LAT WITH FLEX/EXT: ICD-10-PCS | Mod: 26,,, | Performed by: RADIOLOGY

## 2019-08-01 PROCEDURE — 3008F BODY MASS INDEX DOCD: CPT | Mod: CPTII,S$GLB,, | Performed by: PHYSICIAN ASSISTANT

## 2019-08-01 PROCEDURE — 72120 X-RAY BEND ONLY L-S SPINE: CPT | Mod: 26,,, | Performed by: RADIOLOGY

## 2019-08-01 PROCEDURE — 3008F PR BODY MASS INDEX (BMI) DOCUMENTED: ICD-10-PCS | Mod: CPTII,S$GLB,, | Performed by: PLASTIC SURGERY

## 2019-08-01 PROCEDURE — 99214 PR OFFICE/OUTPT VISIT, EST, LEVL IV, 30-39 MIN: ICD-10-PCS | Mod: S$GLB,,, | Performed by: PHYSICIAN ASSISTANT

## 2019-08-01 PROCEDURE — 99999 PR PBB SHADOW E&M-EST. PATIENT-LVL III: ICD-10-PCS | Mod: PBBFAC,,, | Performed by: PLASTIC SURGERY

## 2019-08-01 PROCEDURE — 99213 OFFICE O/P EST LOW 20 MIN: CPT | Mod: S$GLB,,, | Performed by: PLASTIC SURGERY

## 2019-08-01 PROCEDURE — 72100 X-RAY EXAM L-S SPINE 2/3 VWS: CPT | Mod: 26,,, | Performed by: RADIOLOGY

## 2019-08-01 PROCEDURE — 72050 X-RAY EXAM NECK SPINE 4/5VWS: CPT | Mod: TC,FY

## 2019-08-01 PROCEDURE — 99213 PR OFFICE/OUTPT VISIT, EST, LEVL III, 20-29 MIN: ICD-10-PCS | Mod: S$GLB,,, | Performed by: PLASTIC SURGERY

## 2019-08-01 PROCEDURE — 99999 PR PBB SHADOW E&M-EST. PATIENT-LVL IV: ICD-10-PCS | Mod: PBBFAC,,, | Performed by: PHYSICIAN ASSISTANT

## 2019-08-01 PROCEDURE — 73110 X-RAY EXAM OF WRIST: CPT | Mod: 26,RT,, | Performed by: RADIOLOGY

## 2019-08-01 PROCEDURE — 99214 OFFICE O/P EST MOD 30 MIN: CPT | Mod: S$GLB,,, | Performed by: PHYSICIAN ASSISTANT

## 2019-08-01 PROCEDURE — 99999 PR PBB SHADOW E&M-EST. PATIENT-LVL III: CPT | Mod: PBBFAC,,, | Performed by: PLASTIC SURGERY

## 2019-08-01 PROCEDURE — 72120 XR LUMBAR SPINE AP AND LAT WITH FLEX/EXT: ICD-10-PCS | Mod: 26,,, | Performed by: RADIOLOGY

## 2019-08-01 RX ORDER — CYCLOBENZAPRINE HCL 10 MG
10 TABLET ORAL 3 TIMES DAILY PRN
Qty: 90 TABLET | Refills: 0 | Status: SHIPPED | OUTPATIENT
Start: 2019-08-01 | End: 2019-08-31

## 2019-08-01 RX ORDER — GABAPENTIN 300 MG/1
CAPSULE ORAL
Qty: 270 CAPSULE | Refills: 2 | Status: SHIPPED | OUTPATIENT
Start: 2019-08-01 | End: 2020-10-21 | Stop reason: SDUPTHER

## 2019-08-01 NOTE — PATIENT INSTRUCTIONS
It is always good to see you!    I want to get updated xrays of your neck and back. We will call you with results.     I have refilled your neurontin and flexeril. Continue on motrin as needed with food.     Please start [facility] program. I think it will help.     I will see you back in 3 months, email me if you need anything.     Alicia

## 2019-08-02 NOTE — PROGRESS NOTES
Subjective:      Patient ID: Alayna Anderson is a 58 y.o. female.    Chief Complaint: Pain of the Right Wrist    Alayna Anderson presents to clinic today for evaluation of a possible right wrist fracture. Patient states that she suffered a syncopal episode in which she struck her face against a wall and sustained a laceration to her right wrist.  This happened on July 7, 2019.  She was seen at [facility] where x-rays revealed a possible radial styloid fracture and possible navicular fracture.  She was placed into a removable brace and referred to Orthopedics for further evaluation and treatment. She was unable to obtain an appointment until today she has been wearing the brace as recommended.  She states that her swelling has improved however she continues to have discomfort on the dorsal aspect of her wrist. She does have a history of previous trauma and and arthritis within the wrist. She denies any numbness or tingling no other complaints      Review of Systems   Musculoskeletal: Positive for arthritis, joint pain, joint swelling and stiffness.   Neurological: Negative for numbness.   All other systems reviewed and are negative.        Objective:            General    Vitals reviewed.  Constitutional: She is oriented to person, place, and time. She appears well-nourished. No distress.   Pulmonary/Chest: Effort normal.   Neurological: She is alert and oriented to person, place, and time.   Psychiatric: She has a normal mood and affect.             Right Hand/Wrist Exam     Inspection   Effusion: Wrist - present Hand -  absent  Bruising: Wrist - absent   Deformity: Wrist - deformity Hand -  deformity    Pain   Wrist - The patient exhibits pain of the scapholunate/lunate ECU.    Swelling   Wrist - The patient is swollen on the scapholunate/lunate ECU.    Tenderness   The patient is tender to palpation of the dorsal area.    Range of Motion     Wrist   Extension: abnormal   Flexion: abnormal   Pronation: abnormal    Supination: abnormal     Other     Neuorologic Exam    Median Distribution: normal  Ulnar Distribution: normal  Radial Distribution: normal    Comments:  She is able to make composite fist.          Vascular Exam       Capillary Refill  Right Hand: normal capillary refill              Assessment:       Encounter Diagnosis   Name Primary?    Right wrist pain Yes          Plan:       Alayna was seen today for pain.    Diagnoses and all orders for this visit:    Right wrist pain  -     X-Ray Wrist Complete Right; Future      The patient presented today with recent history of trauma and possible fracture of the radial styloid and navicular bones without x-rays.  I discussed the importance of posttraumatic x-rays whether from [facility] or our facility.  Due to time constraints discussed that I would be able to order her x-ray today but not be able to follow up with her x-rays today. I suggested she continue to use the splint as she has been for the last 3-4 weeks.  An x-ray was ordered of the right wrist today. She will follow up me next week on Wednesday for evaluation of the x-ray and recommendations for treatment options. The patient understood this and all questions and concerns were addressed prior to discharge

## 2019-08-06 ENCOUNTER — TELEPHONE (OUTPATIENT)
Dept: SPINE | Facility: CLINIC | Age: 59
End: 2019-08-06

## 2019-08-06 NOTE — TELEPHONE ENCOUNTER
----- Message from Alicia Arreguin PA-C sent at 8/2/2019 12:42 PM CDT -----  Please call her and let her know that her XRs shows degeneration and no new fractures.     No change to plan we discussed.

## 2019-10-08 ENCOUNTER — TELEPHONE (OUTPATIENT)
Dept: SPINE | Facility: CLINIC | Age: 59
End: 2019-10-08

## 2019-10-08 NOTE — TELEPHONE ENCOUNTER
----- Message from Emily Arthur MA sent at 10/8/2019 12:43 PM CDT -----  Contact: Gene with [facility]   No, I don't have anything for this patient  ----- Message -----  From: Sayda Vigil LPN  Sent: 10/7/2019   4:28 PM CDT  To: Emily Arthur MA    Does greg have this form already?    Sayda   ----- Message -----  From: Kyleigh Sung  Sent: 10/7/2019   3:57 PM CDT  To: Kallie Vargas Staff    Name of Who is Calling: Gene with [facility]       What is the request in detail: Gene is requesting a call back. She states that she needs clinical notes and medical necessity form for tens unit faxed to  [phone]. Please advise.       Can the clinic reply by [facility]: No      What Number [facility] Back [facility]  [phone]

## 2019-10-17 ENCOUNTER — CLINICAL SUPPORT (OUTPATIENT)
Dept: SMOKING CESSATION | Facility: CLINIC | Age: 59
End: 2019-10-17
Payer: COMMERCIAL

## 2019-10-17 DIAGNOSIS — F17.200 NICOTINE DEPENDENCE: Primary | ICD-10-CM

## 2019-10-17 PROCEDURE — 99407 PR TOBACCO USE CESSATION INTENSIVE >10 MINUTES: ICD-10-PCS | Mod: S$GLB,,,

## 2019-10-17 PROCEDURE — 99407 BEHAV CHNG SMOKING > 10 MIN: CPT | Mod: S$GLB,,,

## 2019-10-17 NOTE — PROGRESS NOTES
Successful contact with patient regarding tobacco cessation quit #1. Pt states, she was unable to become tobacco free due to daily life stressors and she's not ready to return to the program at this time. Pt informed of her benefit status, future telephone follow ups, and contact information to schedule an appointment when she's ready. Will update the tobacco cessation smart form for 3-6 months on quit #2.

## 2020-03-24 ENCOUNTER — CLINICAL SUPPORT (OUTPATIENT)
Dept: SMOKING CESSATION | Facility: CLINIC | Age: 60
End: 2020-03-24
Payer: COMMERCIAL

## 2020-03-24 DIAGNOSIS — F17.200 NICOTINE DEPENDENCE: Primary | ICD-10-CM

## 2020-03-24 PROCEDURE — 99407 PR TOBACCO USE CESSATION INTENSIVE >10 MINUTES: ICD-10-PCS | Mod: S$GLB,,,

## 2020-03-24 PROCEDURE — 99407 BEHAV CHNG SMOKING > 10 MIN: CPT | Mod: S$GLB,,,

## 2020-03-24 NOTE — PROGRESS NOTES
Spoke with patient today in regard to smoking cessation progress for 12 month phone follow up on quit 1. Patient not tobacco free at this time. Patient has scheduled an appointment to return to the program for Quit attempt #2. Informed patient of benefit period, future follow ups, and contact information if any further help or support is needed. Will resolve episode and complete smart form for Quit attempt #1.

## 2020-03-26 ENCOUNTER — CLINICAL SUPPORT (OUTPATIENT)
Dept: SMOKING CESSATION | Facility: CLINIC | Age: 60
End: 2020-03-26
Payer: COMMERCIAL

## 2020-03-26 DIAGNOSIS — F17.210 HEAVY CIGARETTE SMOKER (20-39 PER DAY): ICD-10-CM

## 2020-03-26 DIAGNOSIS — F17.210 MODERATE CIGARETTE SMOKER (10-19 PER DAY): Primary | ICD-10-CM

## 2020-03-26 PROCEDURE — 99404 PR PREVENT COUNSEL,INDIV,60 MIN: ICD-10-PCS | Mod: S$GLB,,,

## 2020-03-26 PROCEDURE — 99404 PREV MED CNSL INDIV APPRX 60: CPT | Mod: S$GLB,,,

## 2020-03-26 PROCEDURE — 99999 PR PBB SHADOW E&M-EST. PATIENT-LVL I: CPT | Mod: PBBFAC,,,

## 2020-03-26 PROCEDURE — 99999 PR PBB SHADOW E&M-EST. PATIENT-LVL I: ICD-10-PCS | Mod: PBBFAC,,,

## 2020-03-26 RX ORDER — IBUPROFEN 200 MG
TABLET ORAL
Qty: 28 PATCH | Refills: 0 | Status: SHIPPED | OUTPATIENT
Start: 2020-03-26 | End: 2020-03-30 | Stop reason: SDUPTHER

## 2020-03-26 NOTE — Clinical Note
Patient was seen for tobacco cessation re intake.  Patient will begin on 14 mg nicotine patch.  Patient has agreed to bi weekly follow up

## 2020-03-30 ENCOUNTER — TELEPHONE (OUTPATIENT)
Dept: INTERNAL MEDICINE | Facility: CLINIC | Age: 60
End: 2020-03-30

## 2020-03-30 RX ORDER — IBUPROFEN 200 MG
TABLET ORAL
Qty: 28 PATCH | Refills: 0 | Status: SHIPPED | OUTPATIENT
Start: 2020-03-30 | End: 2020-09-10 | Stop reason: CLARIF

## 2020-04-07 ENCOUNTER — CLINICAL SUPPORT (OUTPATIENT)
Dept: SMOKING CESSATION | Facility: CLINIC | Age: 60
End: 2020-04-07
Payer: COMMERCIAL

## 2020-04-07 DIAGNOSIS — F17.210 LIGHT CIGARETTE SMOKER (1-9 CIGS/DAY): Primary | ICD-10-CM

## 2020-04-07 PROCEDURE — 99402 PR PREVENT COUNSEL,INDIV,30 MIN: ICD-10-PCS | Mod: S$GLB,,,

## 2020-04-07 PROCEDURE — 99999 PR PBB SHADOW E&M-EST. PATIENT-LVL I: CPT | Mod: PBBFAC,,,

## 2020-04-07 PROCEDURE — 99999 PR PBB SHADOW E&M-EST. PATIENT-LVL I: ICD-10-PCS | Mod: PBBFAC,,,

## 2020-04-07 PROCEDURE — 99402 PREV MED CNSL INDIV APPRX 30: CPT | Mod: S$GLB,,,

## 2020-04-07 NOTE — Clinical Note
Patient is smoking 5 cpd this week.  Patient is using 21 mg nicotine patch without any negative side effects at this time. We discussed cues, triggers, willpower, reasons and benefits of quitting.  We discussed tobacco cessation strategies to aid in cessation. Patient will attempt a quit this week.  The patient denies any abnormal behavioral or mental changes at this time. The patient will continue with group therapy sessions and medication monitoring by CTTS. Prescribed medication management will be by physician.

## 2020-04-07 NOTE — PROGRESS NOTES
Individual Follow-Up Form    4/7/2020    Quit Date:     Clinical Status of Patient: Outpatient    Length of Service: 30 minutes    Continuing Medication: yes  Patches    Other Medications:      Target Symptoms: Withdrawal and medication side effects. The following were  rated moderate (3) to severe (4) on TCRS:  · Moderate (3):none  · Severe (4): none    Comments:Patient is smoking 5 cpd this week.  Patient is using 21 mg nicotine patch without any negative side effects at this time. We discussed cues, triggers, willpower, reasons and benefits of quitting.  We discussed tobacco cessation strategies to aid in cessation. Patient will attempt a quit this week.  The patient denies any abnormal behavioral or mental changes at this time.   The patient will continue with group therapy sessions and medication monitoring by CTTS. Prescribed medication management will be by physician.        Diagnosis: F17.210    Next Visit: 2 weeks

## 2020-04-14 ENCOUNTER — TELEPHONE (OUTPATIENT)
Dept: SMOKING CESSATION | Facility: CLINIC | Age: 60
End: 2020-04-14

## 2020-04-28 ENCOUNTER — TELEPHONE (OUTPATIENT)
Dept: SMOKING CESSATION | Facility: CLINIC | Age: 60
End: 2020-04-28

## 2020-08-15 ENCOUNTER — CLINICAL SUPPORT (OUTPATIENT)
Dept: URGENT CARE | Facility: CLINIC | Age: 60
End: 2020-08-15
Payer: MEDICARE

## 2020-08-15 VITALS — OXYGEN SATURATION: 98 % | HEART RATE: 77 BPM | RESPIRATION RATE: 18 BRPM | TEMPERATURE: 99 F

## 2020-08-15 DIAGNOSIS — Z01.818 PREOP TESTING: ICD-10-CM

## 2020-08-15 PROCEDURE — U0003 INFECTIOUS AGENT DETECTION BY NUCLEIC ACID (DNA OR RNA); SEVERE ACUTE RESPIRATORY SYNDROME CORONAVIRUS 2 (SARS-COV-2) (CORONAVIRUS DISEASE [COVID-19]), AMPLIFIED PROBE TECHNIQUE, MAKING USE OF HIGH THROUGHPUT TECHNOLOGIES AS DESCRIBED BY CMS-2020-01-R: HCPCS

## 2020-08-17 LAB — SARS-COV-2 RNA RESP QL NAA+PROBE: NOT DETECTED

## 2020-08-18 ENCOUNTER — NURSE TRIAGE (OUTPATIENT)
Dept: ADMINISTRATIVE | Facility: CLINIC | Age: 60
End: 2020-08-18

## 2020-08-18 ENCOUNTER — ANESTHESIA (OUTPATIENT)
Dept: SURGERY | Facility: OTHER | Age: 60
End: 2020-08-18
Payer: MEDICARE

## 2020-08-18 ENCOUNTER — HOSPITAL ENCOUNTER (OUTPATIENT)
Facility: OTHER | Age: 60
Discharge: HOME | End: 2020-08-18
Attending: OPHTHALMOLOGY | Admitting: OPHTHALMOLOGY
Payer: MEDICARE

## 2020-08-18 ENCOUNTER — ANESTHESIA EVENT (OUTPATIENT)
Dept: SURGERY | Facility: OTHER | Age: 60
End: 2020-08-18
Payer: MEDICARE

## 2020-08-18 VITALS
SYSTOLIC BLOOD PRESSURE: 120 MMHG | OXYGEN SATURATION: 95 % | BODY MASS INDEX: 23.55 KG/M2 | HEIGHT: 62 IN | HEART RATE: 77 BPM | RESPIRATION RATE: 18 BRPM | WEIGHT: 128 LBS | TEMPERATURE: 98 F | DIASTOLIC BLOOD PRESSURE: 66 MMHG

## 2020-08-18 DIAGNOSIS — H25.812 COMBINED FORMS OF AGE-RELATED CATARACT OF LEFT EYE: Primary | ICD-10-CM

## 2020-08-18 DIAGNOSIS — Z01.818 PREOP TESTING: ICD-10-CM

## 2020-08-18 PROBLEM — H26.102 TRAUMATIC CATARACT OF LEFT EYE: Status: ACTIVE | Noted: 2020-08-18

## 2020-08-18 PROBLEM — H27.8 ZONULAR DEHISCENCE: Status: ACTIVE | Noted: 2020-08-18

## 2020-08-18 PROCEDURE — 63600175 PHARM REV CODE 636 W HCPCS: Performed by: OPHTHALMOLOGY

## 2020-08-18 PROCEDURE — 37000008 HC ANESTHESIA 1ST 15 MINUTES: Performed by: OPHTHALMOLOGY

## 2020-08-18 PROCEDURE — 27201423 OPTIME MED/SURG SUP & DEVICES STERILE SUPPLY: Performed by: OPHTHALMOLOGY

## 2020-08-18 PROCEDURE — 71000015 HC POSTOP RECOV 1ST HR: Performed by: OPHTHALMOLOGY

## 2020-08-18 PROCEDURE — 63600175 PHARM REV CODE 636 W HCPCS: Performed by: NURSE ANESTHETIST, CERTIFIED REGISTERED

## 2020-08-18 PROCEDURE — 37000009 HC ANESTHESIA EA ADD 15 MINS: Performed by: OPHTHALMOLOGY

## 2020-08-18 PROCEDURE — 36000707: Performed by: OPHTHALMOLOGY

## 2020-08-18 PROCEDURE — 25000003 PHARM REV CODE 250: Performed by: OPHTHALMOLOGY

## 2020-08-18 PROCEDURE — 63600175 PHARM REV CODE 636 W HCPCS: Performed by: ANESTHESIOLOGY

## 2020-08-18 PROCEDURE — 36000706: Performed by: OPHTHALMOLOGY

## 2020-08-18 PROCEDURE — S0020 INJECTION, BUPIVICAINE HYDRO: HCPCS | Performed by: OPHTHALMOLOGY

## 2020-08-18 RX ORDER — PHENYLEPHRINE HYDROCHLORIDE 25 MG/ML
1 SOLUTION/ DROPS OPHTHALMIC
Status: COMPLETED | OUTPATIENT
Start: 2020-08-18 | End: 2020-08-18

## 2020-08-18 RX ORDER — EPINEPHRINE 1 MG/ML
INJECTION, SOLUTION INTRACARDIAC; INTRAMUSCULAR; INTRAVENOUS; SUBCUTANEOUS
Status: DISCONTINUED | OUTPATIENT
Start: 2020-08-18 | End: 2020-08-18 | Stop reason: HOSPADM

## 2020-08-18 RX ORDER — CEFAZOLIN SODIUM 1 G/3ML
INJECTION, POWDER, FOR SOLUTION INTRAMUSCULAR; INTRAVENOUS
Status: DISCONTINUED | OUTPATIENT
Start: 2020-08-18 | End: 2020-08-18 | Stop reason: HOSPADM

## 2020-08-18 RX ORDER — MOXIFLOXACIN 5 MG/ML
SOLUTION/ DROPS OPHTHALMIC
Status: DISCONTINUED | OUTPATIENT
Start: 2020-08-18 | End: 2020-08-18 | Stop reason: HOSPADM

## 2020-08-18 RX ORDER — FENTANYL CITRATE 50 UG/ML
INJECTION, SOLUTION INTRAMUSCULAR; INTRAVENOUS
Status: DISCONTINUED | OUTPATIENT
Start: 2020-08-18 | End: 2020-08-18

## 2020-08-18 RX ORDER — MOXIFLOXACIN 5 MG/ML
1 SOLUTION/ DROPS OPHTHALMIC
Status: COMPLETED | OUTPATIENT
Start: 2020-08-18 | End: 2020-08-18

## 2020-08-18 RX ORDER — TROPICAMIDE 10 MG/ML
1 SOLUTION/ DROPS OPHTHALMIC
Status: COMPLETED | OUTPATIENT
Start: 2020-08-18 | End: 2020-08-18

## 2020-08-18 RX ORDER — BUPIVACAINE HYDROCHLORIDE 7.5 MG/ML
INJECTION, SOLUTION EPIDURAL; RETROBULBAR
Status: DISCONTINUED | OUTPATIENT
Start: 2020-08-18 | End: 2020-08-18 | Stop reason: HOSPADM

## 2020-08-18 RX ORDER — DEXAMETHASONE SODIUM PHOSPHATE 4 MG/ML
INJECTION, SOLUTION INTRA-ARTICULAR; INTRALESIONAL; INTRAMUSCULAR; INTRAVENOUS; SOFT TISSUE
Status: DISCONTINUED | OUTPATIENT
Start: 2020-08-18 | End: 2020-08-18 | Stop reason: HOSPADM

## 2020-08-18 RX ORDER — LIDOCAINE HYDROCHLORIDE 40 MG/ML
INJECTION, SOLUTION RETROBULBAR
Status: DISCONTINUED | OUTPATIENT
Start: 2020-08-18 | End: 2020-08-18 | Stop reason: HOSPADM

## 2020-08-18 RX ORDER — LIDOCAINE HYDROCHLORIDE 20 MG/ML
INJECTION, SOLUTION INFILTRATION; PERINEURAL
Status: DISCONTINUED | OUTPATIENT
Start: 2020-08-18 | End: 2020-08-18 | Stop reason: HOSPADM

## 2020-08-18 RX ORDER — CYCLOPENTOLATE HYDROCHLORIDE 10 MG/ML
1 SOLUTION/ DROPS OPHTHALMIC
Status: COMPLETED | OUTPATIENT
Start: 2020-08-18 | End: 2020-08-18

## 2020-08-18 RX ORDER — MIDAZOLAM HYDROCHLORIDE 1 MG/ML
INJECTION INTRAMUSCULAR; INTRAVENOUS
Status: DISCONTINUED | OUTPATIENT
Start: 2020-08-18 | End: 2020-08-18

## 2020-08-18 RX ORDER — NEOMYCIN SULFATE, POLYMYXIN B SULFATE, AND DEXAMETHASONE 3.5; 10000; 1 MG/G; [USP'U]/G; MG/G
OINTMENT OPHTHALMIC
Status: DISCONTINUED | OUTPATIENT
Start: 2020-08-18 | End: 2020-08-18 | Stop reason: HOSPADM

## 2020-08-18 RX ORDER — SODIUM CHLORIDE, SODIUM LACTATE, POTASSIUM CHLORIDE, CALCIUM CHLORIDE 600; 310; 30; 20 MG/100ML; MG/100ML; MG/100ML; MG/100ML
INJECTION, SOLUTION INTRAVENOUS CONTINUOUS PRN
Status: DISCONTINUED | OUTPATIENT
Start: 2020-08-18 | End: 2020-08-18

## 2020-08-18 RX ORDER — PREDNISOLONE ACETATE 10 MG/ML
SUSPENSION/ DROPS OPHTHALMIC
Status: DISCONTINUED | OUTPATIENT
Start: 2020-08-18 | End: 2020-08-18 | Stop reason: HOSPADM

## 2020-08-18 RX ADMIN — FENTANYL CITRATE 100 MCG: 50 INJECTION, SOLUTION INTRAMUSCULAR; INTRAVENOUS at 10:08

## 2020-08-18 RX ADMIN — FENTANYL CITRATE 50 MCG: 50 INJECTION, SOLUTION INTRAMUSCULAR; INTRAVENOUS at 11:08

## 2020-08-18 RX ADMIN — MOXIFLOXACIN 1 DROP: 5 SOLUTION/ DROPS OPHTHALMIC at 08:08

## 2020-08-18 RX ADMIN — CYCLOPENTOLATE HYDROCHLORIDE 1 DROP: 10 SOLUTION/ DROPS OPHTHALMIC at 08:08

## 2020-08-18 RX ADMIN — PHENYLEPHRINE HYDROCHLORIDE 1 DROP: 25 SOLUTION/ DROPS OPHTHALMIC at 08:08

## 2020-08-18 RX ADMIN — TROPICAMIDE 1 DROP: 10 SOLUTION/ DROPS OPHTHALMIC at 08:08

## 2020-08-18 RX ADMIN — MIDAZOLAM HYDROCHLORIDE 2 MG: 1 INJECTION, SOLUTION INTRAMUSCULAR; INTRAVENOUS at 10:08

## 2020-08-18 RX ADMIN — SODIUM CHLORIDE, SODIUM LACTATE, POTASSIUM CHLORIDE, AND CALCIUM CHLORIDE: .6; .31; .03; .02 INJECTION, SOLUTION INTRAVENOUS at 09:08

## 2020-08-18 NOTE — OP NOTE
DATE OF PROCEDURE: 8/18/20    PREOPERATIVE DIAGNOSES:  1.  Left traumatic brunescent cataract.  2.  No red reflex, left eye.  3.  Zonular dehiscence, left eye  4.  Iridodialysis, left eye    POSTOPERATIVE DIAGNOSES:  1.  Left traumatic brunescent cataract.  2.  No red reflex, left eye.  3.  Significant zonular dehiscence, left eye  4.  Iridodialysis, left eye      PROCEDURE PERFORMED:  Phaco with trypan blue and Mackool hooks and anterior vitrectomy, left eye.    SURGEON: Emilee Estrada MD    ASSISTANT: Stephen Rinaldi MD (resident)    BLOOD LOSS: none    ANESTHESIA:  MAC/retrobulbar block        PROCEDURE IN DETAIL:    The patient was brought into the Operating Room.    The patient received mild sedation by Anesthesia. A retrobulbar block was done to the left orbit without problems. The patient's left eye was   prepped and draped in the usual sterile manner for Ophthalmology.  A lid   speculum was placed to open the right eyelids.  Surgery was done under the   microscope.  Under the microscope, it was noted that there was no red   reflex secondary to a brunescent cataract. There was an iridodialysis inferonasally, a peripheral corneal scar and zonules were missing from about 6-9 o'clock.  Using the 0.12s and side-port   incision blade, a paracentesis was done at the 5 o'clock position.    Epishugarcaine was injected into the anterior chamber   followed by air, followed by trypan blue, followed by Viscoat.  Using the   keratome blade, a clear corneal incision was done at the 2:30 o'clock position.    Using cystotome and Utrata forceps, a capsulorrhexis was performed and significant folds were noted due to significant zonular dehiscence.  Using BSS in a cannula, gentle hydrodissection was performed. The nucleus was very mobile. It was noted that the zonular dehiscence was at least 180 degrees. 3 Mackool capsular hooks were inserted and the anterior capsule supported. Using   the phaco machine, the nucleus was carefully  phacoemulsified. The nucleus was very dense and leathery and while removing last quadrant a large tear in the posterior capsule was noted. Viscoelastic was injected before removing the phaco piece and triescence was injected. Vitreous was visualized and using the anterior vitrectomy an anterior vitrectomy was done. Then, the remaining nuclear quadrant was phacoemulsified. More triescence was injected and vitrectomy performed. The capsular hooks were removed. Miochol was injected. A decision was made to leave the patient aphakic. An ACIOL was felt to not be a good option due to the iridodialysis and PAS and angle pathology noted on preoperative gonioscopy exam. It was decided to evaluate for a secondary IOL once the eye heals from this surgery. No vitreous was seen in the anterior chamber. A 10-0 nylon suture was placed. Subconjunctival ancef and decadron were injected in the inferior fornix and maxitrol ointment was applied to the eye and the eye was patched and shielded.  The patient tolerated the procedure well and  went back to her room in stable condition.  The patient will be seen the next   morning at the [facility].

## 2020-08-18 NOTE — DISCHARGE INSTRUCTIONS
No heavy activity. No bending over. Keep eye patched and shielded at all times. .No bending or lifting at surgical site.    1) Keep eye patch on operative site until seen in clinic. 2) Instruct patient to keep operative eye patched or shielded at bedtime for one week.    1) Clean shield on topical cases. 2) Keep eye shield on operative site until seen in clinic. 3) Instruct patient to keep operative eye patched or shielded at bedtime for one week.    No heavy activity. No bending over. Keep eye shielded at all times except to put eye drops.Use vigamox (or ciprofloxacin or Polytrim), prednisolone acetate (or lotemax or durezol) and nevanac (or ketorolac) four times a day (if ilevro or prolensa once a day) in the operate eye starting today.        Anesthesia: Monitored Anesthesia Care (MAC)    Anesthesia Safety  · Have an adult family member or friend drive you home after the procedure.  · For the first 24 hours after your surgery:  ¨ Do not drive or use heavy equipment.  ¨ Do not make important decisions or sign documents.  ¨ Avoid alcohol.  ¨ Have someone stay with you, if possible. They can watch for problems and help keep you safe.

## 2020-08-18 NOTE — DISCHARGE SUMMARY
Patient came for cataract surgery in her left eye. Patient had cataract surgery in her left eye. Patient tolerated procedure well and will be discharge home today. Pt will follow up at the eye clinic tomorrow. See orders for post operative instructions

## 2020-08-18 NOTE — TELEPHONE ENCOUNTER
Reason for Disposition   [1] Caller has URGENT question AND [2] triager unable to answer question    Additional Information   Negative: Sounds like a life-threatening emergency to the triager   Negative: Difficulty breathing   Negative: Chest pain   Negative: Acting confused (e.g., disoriented, slurred speech) or excessively sleepy   Negative: Surgical incision symptoms and questions   Negative: [1] Discomfort (pain, burning or stinging) when passing urine AND [2] male   Negative: [1] Discomfort (pain, burning or stinging) when passing urine AND [2] female   Negative: Constipation   Negative: New or worsening leg (calf, thigh) pain   Negative: New or worsening leg swelling   Negative: Dizziness is severe, or persists > 24 hours after surgery   Negative: Pain, redness, swelling, or pus at IV Site   Negative: Symptoms arising from use of a urinary catheter (Sykes or Coude)   Negative: Cast problems or questions   Negative: Medication question   Negative: [1] Widespread rash AND [2] bright red, sunburn-like   Negative: [1] SEVERE headache AND [2] after spinal (epidural) anesthesia   Negative: [1] Vomiting AND [2] persists > 4 hours   Negative: [1] Vomiting AND [2] abdomen looks much more swollen than usual   Negative: [1] Drinking very little AND [2] dehydration suspected (e.g., no urine > 12 hours, very dry mouth, very lightheaded)   Negative: Patient sounds very sick or weak to the triager   Negative: Sounds like a serious complication to the triager   Negative: Fever > 100.4 F (38.0 C)   Negative: [1] SEVERE post-op pain (e.g., excruciating, pain scale 8-10) AND [2] not controlled with pain medications    Protocols used: POST-OP SYMPTOMS AND BANKOPWDO-C-PY    Pt stated she had surgery on her left eye today. Stated she sees something that looks like black liquid ink in her eye that comes and goes. Pt wants to know if this is normal. Pt stated she also needs clarification about when to  take her eye patch off and her eye drops. Advised a message will be sent to the Provider to contact her today. Pt verbalized understanding. Message sent to Provider on secure chat as well.     [facility] and [facility] stated they cannot call the Doctor or her staff because she does not work for [facility] but only uses the space.  provided me with an outside number to try. Spoke with Kimberly SUE At Dr. Estrada's office. Informed of Pt's concerns. Kimberly stated she would give the information to the Nurse to call Pt.

## 2020-08-18 NOTE — ANESTHESIA PREPROCEDURE EVALUATION
08/18/2020  Alayna Anderson is a 59 y.o., female.    Anesthesia Evaluation    I have reviewed the Patient Summary Reports.    I have reviewed the Nursing Notes.    I have reviewed the Medications.     Review of Systems  Anesthesia Hx:  No problems with previous Anesthesia  Denies Family Hx of Anesthesia complications.   Denies Personal Hx of Anesthesia complications.   Social:  Smoker    Hematology/Oncology:  Hematology Normal   Oncology Normal     EENT/Dental:EENT/Dental Normal   Cardiovascular:  Cardiovascular Normal     Pulmonary:   COPD, moderate    Renal/:  Renal/ Normal     Hepatic/GI:  Hepatic/GI Normal    Musculoskeletal:  Musculoskeletal Normal    Neurological:   Seizures    Endocrine:  Endocrine Normal    Dermatological:  Skin Normal    Psych:  Psychiatric Normal  ETOH         Physical Exam  General:  Well nourished    Airway/Jaw/Neck:  Airway Findings: Mouth Opening: Normal Mallampati: II      Dental:  Dental Findings: In tact        Mental Status:  Mental Status Findings:  Cooperative, Alert and Oriented         Anesthesia Plan  Type of Anesthesia, risks & benefits discussed:  Anesthesia Type:  MAC  Patient's Preference:   Intra-op Monitoring Plan: standard ASA monitors  Intra-op Monitoring Plan Comments:   Post Op Pain Control Plan: multimodal analgesia  Post Op Pain Control Plan Comments:   Induction:   IV  Beta Blocker:         Informed Consent: Patient understands risks and agrees with Anesthesia plan.  Questions answered. Anesthesia consent signed with patient.  ASA Score: 3     Day of Surgery Review of History & Physical:    H&P update referred to the surgeon.     Anesthesia Plan Notes: Retrobulbar block per surgeon        Ready For Surgery From Anesthesia Perspective.

## 2020-08-18 NOTE — ANESTHESIA POSTPROCEDURE EVALUATION
Anesthesia Post Evaluation    Patient: Alayna Anderson    Procedure(s) Performed: Procedure(s) (LRB):  VITRECTOMY, ANTERIOR APPROACH (Left)  EXTRACTION, CATARACT (Left)    Final Anesthesia Type: MAC    Patient location during evaluation: [facility]  Patient participation: Yes- Able to Participate  Level of consciousness: awake and alert, awake and oriented  Post-procedure vital signs: reviewed and stable  Pain management: adequate  Airway patency: patent    PONV status at discharge: No PONV      Cardiovascular status: blood pressure returned to baseline  Respiratory status: unassisted and spontaneous ventilation  Hydration status: euvolemic  Follow-up not needed.          Vitals Value Taken Time   /70 08/18/20 0848   Temp 36.7 °C (98 °F) 08/18/20 0847   Pulse 73 08/18/20 0847   Resp 20 08/18/20 0847   SpO2 96 % 08/18/20 0847         No case tracking events are documented in the log.      Pain/Jose Alberto Score: No data recorded

## 2020-08-18 NOTE — ANESTHESIA POSTPROCEDURE EVALUATION
Anesthesia Post Evaluation    Patient: Alayna Anderson    Procedure(s) Performed: Procedure(s) (LRB):  VITRECTOMY, ANTERIOR APPROACH (Left)  EXTRACTION, CATARACT (Left)    Final Anesthesia Type: MAC    Patient location during evaluation: [facility]  Patient participation: Yes- Able to Participate  Level of consciousness: awake and alert  Post-procedure vital signs: reviewed and stable  Pain management: adequate  Airway patency: patent    PONV status at discharge: No PONV  Anesthetic complications: no      Cardiovascular status: blood pressure returned to baseline  Respiratory status: unassisted and room air  Hydration status: euvolemic  Follow-up not needed.          Vitals Value Taken Time   BP  08/18/20 1239   Temp  08/18/20 1239   Pulse  08/18/20 1239   Resp  08/18/20 1239   SpO2  08/18/20 1239         No case tracking events are documented in the log.      Pain/Jose Alberto Score: No data recorded

## 2020-08-18 NOTE — PLAN OF CARE
Patient prefers to have sister, Rubia, updated with patient's postop status and  Moi, friend, present for discharge.

## 2020-09-04 NOTE — H&P
Subjective:     Pain deformity left knee long-standing intolerable ambulates with a walker originally scheduled a few weeks ago she canceled because of COVID virus.  Now reschedule.  She did not have the virus she was just concerned.  Strong history for alcoholism.  She has been sober for years    Patient Active Problem List    Diagnosis Date Noted    Combined forms of age-related cataract of left eye 08/18/2020    Traumatic cataract of left eye 08/18/2020    Zonular dehiscence 08/18/2020    Right hand pain 07/13/2018    Age-related nuclear cataract of right eye 04/17/2018    Mixed simple and mucopurulent chronic bronchitis 03/01/2018    Tremor 06/08/2014    Alcohol withdrawal     Alcohol intoxication 06/07/2014    Tobacco abuse 06/07/2014    Hypomagnesemia 06/07/2014    DVT prophylaxis 06/07/2014    Seizure disorder 11/13/2013    Homelessness 11/13/2013    Alcohol withdrawal 11/13/2013    Chest pain, non-cardiac 11/13/2013    Bipolar disease, chronic 11/13/2013    Pneumonia 11/12/2013     Past Medical History:   Diagnosis Date    Alcohol abuse     Arthritis     Asthma     Bipolar disorder     Cancer     cervical    COPD (chronic obstructive pulmonary disease)     Depression     Disc degeneration     Diverticulitis     Emphysema lung     Fatty liver     Foot fracture     left    Homelessness     Seizures     Snores     Tobacco abuse       Past Surgical History:   Procedure Laterality Date    ANTERIOR VITRECTOMY Left 8/18/2020    Procedure: VITRECTOMY, ANTERIOR APPROACH;  Surgeon: Emilee Estrada MD;  Location: [facility];  Service: Ophthalmology;  Laterality: Left;    CARPAL TUNNEL RELEASE Right 7/13/2018    Procedure: RELEASE-CARPAL TUNNEL;  Surgeon: Fransisco Jin Jr., MD;  Location: [facility];  Service: Orthopedics;  Laterality: Right;    CERVIX SURGERY      EXTRACTION OF CATARACT Left 8/18/2020    Procedure: EXTRACTION, CATARACT;  Surgeon: Emilee Estrada MD;  Location:  [facility] OR;  Service: Ophthalmology;  Laterality: Left;    EYE SURGERY Right     cataract    TONSILLECTOMY      TUBAL LIGATION        No medications prior to admission.     Review of patient's allergies indicates:   Allergen Reactions    Opioids - morphine analogues Swelling     Throat swelling      Social History     Tobacco Use    Smoking status: Current Every Day Smoker     Packs/day: 1.00     Years: 45.00     Pack years: 45.00    Smokeless tobacco: Former User     Types: Snuff, Chew     Quit date: 3/1/1965   Substance Use Topics    Alcohol use: No     Comment: stopped drinking a year ago      Family History   Problem Relation Age of Onset    Stroke Mother       Review of Systems  Pertinent items are noted in HPI.    Objective:     No data found.  Heart regular lungs clear thin crepitance tenderness deformed left knee ambulating with a walker    Imaging Review  Loss of joint space deformity    Assessment:     There are no hospital problems to display for this patient.      Plan:     The various methods of treatment have been discussed with the patient and family.   After consideration of risks, benefits and other options for treatment, the patient has consented to surgical interventions (significant risk discussed).  Questions were answered and Pre-op teaching was done by me.

## 2020-09-10 ENCOUNTER — ANESTHESIA EVENT (OUTPATIENT)
Dept: SURGERY | Facility: OTHER | Age: 60
End: 2020-09-10
Payer: MEDICARE

## 2020-09-10 ENCOUNTER — HOSPITAL ENCOUNTER (OUTPATIENT)
Dept: PREADMISSION TESTING | Facility: OTHER | Age: 60
Discharge: HOME | End: 2020-09-10
Attending: ORTHOPAEDIC SURGERY
Payer: MEDICARE

## 2020-09-10 VITALS
RESPIRATION RATE: 16 BRPM | OXYGEN SATURATION: 96 % | TEMPERATURE: 97 F | HEART RATE: 82 BPM | WEIGHT: 130 LBS | SYSTOLIC BLOOD PRESSURE: 109 MMHG | BODY MASS INDEX: 23.92 KG/M2 | HEIGHT: 62 IN | DIASTOLIC BLOOD PRESSURE: 59 MMHG

## 2020-09-10 LAB
ANION GAP SERPL CALC-SCNC: 12 MMOL/L (ref 8–16)
BACTERIA #/AREA URNS HPF: ABNORMAL /HPF
BASOPHILS # BLD AUTO: 0.03 K/UL (ref 0–0.2)
BASOPHILS NFR BLD: 0.6 % (ref 0–1.9)
BILIRUB UR QL STRIP: NEGATIVE
BUN SERPL-MCNC: 10 MG/DL (ref 6–20)
CALCIUM SERPL-MCNC: 9.6 MG/DL (ref 8.7–10.5)
CHLORIDE SERPL-SCNC: 98 MMOL/L (ref 95–110)
CLARITY UR: CLEAR
CO2 SERPL-SCNC: 28 MMOL/L (ref 23–29)
COLOR UR: YELLOW
CREAT SERPL-MCNC: 0.6 MG/DL (ref 0.5–1.4)
DIFFERENTIAL METHOD: NORMAL
EOSINOPHIL # BLD AUTO: 0.2 K/UL (ref 0–0.5)
EOSINOPHIL NFR BLD: 4.1 % (ref 0–8)
ERYTHROCYTE [DISTWIDTH] IN BLOOD BY AUTOMATED COUNT: 13.5 % (ref 11.5–14.5)
EST. GFR  (AFRICAN AMERICAN): >60 ML/MIN/1.73 M^2
EST. GFR  (NON AFRICAN AMERICAN): >60 ML/MIN/1.73 M^2
GLUCOSE SERPL-MCNC: 74 MG/DL (ref 70–110)
GLUCOSE UR QL STRIP: NEGATIVE
HCT VFR BLD AUTO: 42.3 % (ref 37–48.5)
HGB BLD-MCNC: 14.1 G/DL (ref 12–16)
HGB UR QL STRIP: ABNORMAL
HYALINE CASTS #/AREA URNS LPF: 0 /LPF
IMM GRANULOCYTES # BLD AUTO: 0.01 K/UL (ref 0–0.04)
IMM GRANULOCYTES NFR BLD AUTO: 0.2 % (ref 0–0.5)
KETONES UR QL STRIP: NEGATIVE
LEUKOCYTE ESTERASE UR QL STRIP: ABNORMAL
LYMPHOCYTES # BLD AUTO: 1.9 K/UL (ref 1–4.8)
LYMPHOCYTES NFR BLD: 38.2 % (ref 18–48)
MCH RBC QN AUTO: 30.9 PG (ref 27–31)
MCHC RBC AUTO-ENTMCNC: 33.3 G/DL (ref 32–36)
MCV RBC AUTO: 93 FL (ref 82–98)
MICROSCOPIC COMMENT: ABNORMAL
MONOCYTES # BLD AUTO: 0.4 K/UL (ref 0.3–1)
MONOCYTES NFR BLD: 8.5 % (ref 4–15)
NEUTROPHILS # BLD AUTO: 2.3 K/UL (ref 1.8–7.7)
NEUTROPHILS NFR BLD: 48.4 % (ref 38–73)
NITRITE UR QL STRIP: NEGATIVE
NRBC BLD-RTO: 0 /100 WBC
PH UR STRIP: 6 [PH] (ref 5–8)
PLATELET # BLD AUTO: 162 K/UL (ref 150–350)
PMV BLD AUTO: 11 FL (ref 9.2–12.9)
POTASSIUM SERPL-SCNC: 4.8 MMOL/L (ref 3.5–5.1)
PROT UR QL STRIP: NEGATIVE
RBC # BLD AUTO: 4.57 M/UL (ref 4–5.4)
RBC #/AREA URNS HPF: 3 /HPF (ref 0–4)
SODIUM SERPL-SCNC: 138 MMOL/L (ref 136–145)
SP GR UR STRIP: 1.02 (ref 1–1.03)
SQUAMOUS #/AREA URNS HPF: 12 /HPF
URN SPEC COLLECT METH UR: ABNORMAL
UROBILINOGEN UR STRIP-ACNC: 1 EU/DL
WBC # BLD AUTO: 4.84 K/UL (ref 3.9–12.7)
WBC #/AREA URNS HPF: 25 /HPF (ref 0–5)
WBC CLUMPS URNS QL MICRO: ABNORMAL

## 2020-09-10 PROCEDURE — 81000 URINALYSIS NONAUTO W/SCOPE: CPT

## 2020-09-10 PROCEDURE — 36415 COLL VENOUS BLD VENIPUNCTURE: CPT

## 2020-09-10 PROCEDURE — 85025 COMPLETE CBC W/AUTO DIFF WBC: CPT

## 2020-09-10 PROCEDURE — 80048 BASIC METABOLIC PNL TOTAL CA: CPT

## 2020-09-10 RX ORDER — MIRTAZAPINE 15 MG/1
15 TABLET, FILM COATED ORAL NIGHTLY
COMMUNITY

## 2020-09-10 RX ORDER — LIDOCAINE HYDROCHLORIDE 10 MG/ML
0.5 INJECTION, SOLUTION EPIDURAL; INFILTRATION; INTRACAUDAL; PERINEURAL ONCE
Status: CANCELLED | OUTPATIENT
Start: 2020-09-10 | End: 2020-09-10

## 2020-09-10 RX ORDER — ALBUTEROL SULFATE 2.5 MG/.5ML
2.5 SOLUTION RESPIRATORY (INHALATION)
Status: CANCELLED | OUTPATIENT
Start: 2020-09-10 | End: 2020-09-10

## 2020-09-10 RX ORDER — CLONAZEPAM 0.5 MG/1
0.5 TABLET ORAL 2 TIMES DAILY PRN
COMMUNITY
End: 2020-09-10 | Stop reason: CLARIF

## 2020-09-10 RX ORDER — ACETAMINOPHEN 500 MG
1000 TABLET ORAL
Status: CANCELLED | OUTPATIENT
Start: 2020-09-10 | End: 2020-09-10

## 2020-09-10 RX ORDER — SODIUM CHLORIDE, SODIUM LACTATE, POTASSIUM CHLORIDE, CALCIUM CHLORIDE 600; 310; 30; 20 MG/100ML; MG/100ML; MG/100ML; MG/100ML
INJECTION, SOLUTION INTRAVENOUS CONTINUOUS
Status: CANCELLED | OUTPATIENT
Start: 2020-09-10

## 2020-09-10 NOTE — DISCHARGE INSTRUCTIONS
Information to Prepare you for your Surgery    PRE-ADMIT TESTING -  [phone]    [address]  [place] [facility]          Your surgery has been scheduled at [facility]. We are pleased to have the opportunity to serve you. For Further Information please call [phone].    On the day of surgery please report to the Information Desk on the 1st floor.    · CONTACT YOUR PHYSICIAN'S OFFICE THE DAY PRIOR TO YOUR SURGERY TO OBTAIN YOUR ARRIVAL TIME.     · The evening before surgery do not eat anything after 9 p.m. ( this includes hard candy, chewing gum and mints).  You may only have GATORADE, POWERADE AND WATER  from 9 p.m. until you leave your home.   DO NOT DRINK ANY LIQUIDS ON THE WAY TO THE HOSPITAL.      SPECIAL MEDICATION INSTRUCTIONS: TAKE medications checked off by the Anesthesiologist on your Medication List.    Angiogram Patients: Take medications as instructed by your physician, including aspirin.     Surgery Patients:    If you take ASPIRIN - Your PHYSICIAN/SURGEON will need to inform you IF/OR when you need to stop taking aspirin prior to your surgery.     Do Not take any medications containing IBUPROFEN.  Do Not Wear any make-up or dark nail polish   (especially eye make-up) to surgery. If you come to surgery with makeup on you will be required to remove the makeup or nail polish.    Do not shave your surgical area at least 5 days prior to your surgery. The surgical prep will be performed at the hospital according to Infection Control regulations.    Leave all valuables at home.   Do Not wear any jewelry or watches, including any metal in body piercings. Jewelry must be removed prior to coming to the hospital.  There is a possibility that rings that are unable to be removed may be cut off if they are on the surgical extremity.    Contact Lens must be removed before surgery. Either do not wear the contact lens or bring a case and solution for  storage.  Please bring a container for eyeglasses or dentures as required.  Bring any paperwork your physician has provided, such as consent forms,  history and physicals, doctor's orders, etc.   Bring comfortable clothes that are loose fitting to wear upon discharge. Take into consideration the type of surgery being performed.  Maintain your diet as advised per your physician the day prior to surgery.      Adequate rest the night before surgery is advised.   Park in the Parking lot behind the hospital or in the [facility] across the street from the parking lot. Parking is complimentary.  If you will be discharged the same day as your procedure, please arrange for a responsible adult to drive you home or to accompany you if traveling by taxi.   YOU WILL NOT BE PERMITTED TO DRIVE OR TO LEAVE THE HOSPITAL ALONE AFTER SURGERY.   If you are being discharged the same day, it is strongly recommended that you arrange for someone to remain with you for the first 24 hrs following your surgery.    The Surgeon will speak to your family/visitor after your surgery regarding the outcome of your surgery and post op care.  The Surgeon may speak to you after your surgery, but there is a possibility you may not remember the details.  Please check with your family members regarding the conversation with the Surgeon.    We strongly recommend whoever is bringing you home be present for discharge instructions.  This will ensure a thorough understanding for your post op home care.    ALL CHILDREN MUST ALWAYS BE ACCOMPANIED BY AN ADULT.    Visitors-Refer to current Visitor policy handouts.    Thank you for your cooperation.  The Staff of [facility].                Bathing Instructions with Hibiclens     Shower the evening before and morning of your procedure with Hibiclens:   Wash your face with water and your regular face wash/soap   Apply Hibiclens directly on your skin or on a wet washcloth and wash  gently. When showering: Move away from the shower stream when applying Hibiclens to avoid rinsing off too soon.   Rinse thoroughly with warm water   Do not dilute Hibiclens         Dry off as usual, do not use any deodorant, powder, body lotions, perfume, after shave or cologne.

## 2020-09-10 NOTE — ANESTHESIA PREPROCEDURE EVALUATION
09/10/2020  Alayna Anderson is a 59 y.o., female.    Anesthesia Evaluation    I have reviewed the Patient Summary Reports.    I have reviewed the Nursing Notes. I have reviewed the NPO Status.   I have reviewed the Medications.     Review of Systems  Anesthesia Hx:  No problems with previous Anesthesia  History of prior surgery of interest to airway management or planning: Previous anesthesia: MAC  Eye surg Voodoo with MAC.  Procedure performed at an [facility]. Denies Family Hx of Anesthesia complications.   Denies Personal Hx of Anesthesia complications.   Social:  Smoker    Hematology/Oncology:  Hematology Normal   Oncology Normal     EENT/Dental:EENT/Dental Normal   Cardiovascular:  Cardiovascular Normal Exercise tolerance: good     Pulmonary:   COPD, moderate Asthma mild    Renal/:  Renal/ Normal     Hepatic/GI:  Hepatic/GI Normal Fatty liver   Musculoskeletal:   Arthritis     Neurological:   Seizures    Endocrine:  Endocrine Normal    Dermatological:  Skin Normal    Psych:   Psychiatric History ETOH         Physical Exam  General:  Well nourished    Airway/Jaw/Neck:  Airway Findings: Mouth Opening: Normal Tongue: Normal  General Airway Assessment: Adult  Mallampati: II      Dental:  Dental Findings: Upper Dentures, Lower Dentures        Mental Status:  Mental Status Findings:  Cooperative         Anesthesia Plan  Type of Anesthesia, risks & benefits discussed:  Anesthesia Type:  spinal  Patient's Preference:   Intra-op Monitoring Plan: standard ASA monitors  Intra-op Monitoring Plan Comments:   Post Op Pain Control Plan: per primary service following discharge from PACU and multimodal analgesia  Post Op Pain Control Plan Comments:   Induction:   IV  Beta Blocker:         Informed Consent: Patient understands risks and agrees with Anesthesia plan.  Questions answered. Anesthesia consent  signed with patient.  ASA Score: 3     Day of Surgery Review of History & Physical:    H&P update referred to the surgeon.     Anesthesia Plan Notes: Plan SAB w sedation        Ready For Surgery From Anesthesia Perspective.

## 2020-09-14 ENCOUNTER — CLINICAL SUPPORT (OUTPATIENT)
Dept: URGENT CARE | Facility: CLINIC | Age: 60
End: 2020-09-14
Payer: MEDICARE

## 2020-09-14 DIAGNOSIS — Z03.818 ENCOUNTER FOR OBSERVATION FOR SUSPECTED EXPOSURE TO OTHER BIOLOGICAL AGENTS RULED OUT: ICD-10-CM

## 2020-09-14 DIAGNOSIS — Z13.9 ENCOUNTER FOR SCREENING: Primary | ICD-10-CM

## 2020-09-14 PROCEDURE — U0003 INFECTIOUS AGENT DETECTION BY NUCLEIC ACID (DNA OR RNA); SEVERE ACUTE RESPIRATORY SYNDROME CORONAVIRUS 2 (SARS-COV-2) (CORONAVIRUS DISEASE [COVID-19]), AMPLIFIED PROBE TECHNIQUE, MAKING USE OF HIGH THROUGHPUT TECHNOLOGIES AS DESCRIBED BY CMS-2020-01-R: HCPCS

## 2020-09-15 LAB — SARS-COV-2 RNA RESP QL NAA+PROBE: NOT DETECTED

## 2020-09-17 ENCOUNTER — ANESTHESIA (OUTPATIENT)
Dept: SURGERY | Facility: OTHER | Age: 60
End: 2020-09-17
Payer: MEDICARE

## 2020-09-17 ENCOUNTER — TELEPHONE (OUTPATIENT)
Dept: URGENT CARE | Facility: CLINIC | Age: 60
End: 2020-09-17

## 2020-09-17 ENCOUNTER — HOSPITAL ENCOUNTER (OUTPATIENT)
Facility: OTHER | Age: 60
Discharge: HOME HEALTH | End: 2020-09-19
Attending: ORTHOPAEDIC SURGERY | Admitting: ORTHOPAEDIC SURGERY
Payer: MEDICARE

## 2020-09-17 DIAGNOSIS — M17.9 OA (OSTEOARTHRITIS) OF KNEE: ICD-10-CM

## 2020-09-17 PROCEDURE — 63600175 PHARM REV CODE 636 W HCPCS: Performed by: ORTHOPAEDIC SURGERY

## 2020-09-17 PROCEDURE — 27201423 OPTIME MED/SURG SUP & DEVICES STERILE SUPPLY: Performed by: ORTHOPAEDIC SURGERY

## 2020-09-17 PROCEDURE — 25000242 PHARM REV CODE 250 ALT 637 W/ HCPCS: Performed by: ANESTHESIOLOGY

## 2020-09-17 PROCEDURE — C1776 JOINT DEVICE (IMPLANTABLE): HCPCS | Performed by: ORTHOPAEDIC SURGERY

## 2020-09-17 PROCEDURE — 94799 UNLISTED PULMONARY SVC/PX: CPT

## 2020-09-17 PROCEDURE — 25000003 PHARM REV CODE 250: Performed by: NURSE ANESTHETIST, CERTIFIED REGISTERED

## 2020-09-17 PROCEDURE — C9290 INJ, BUPIVACAINE LIPOSOME: HCPCS | Performed by: ORTHOPAEDIC SURGERY

## 2020-09-17 PROCEDURE — 63600175 PHARM REV CODE 636 W HCPCS: Performed by: SPECIALIST

## 2020-09-17 PROCEDURE — 71000033 HC RECOVERY, INTIAL HOUR: Performed by: ORTHOPAEDIC SURGERY

## 2020-09-17 PROCEDURE — 63600175 PHARM REV CODE 636 W HCPCS: Performed by: NURSE ANESTHETIST, CERTIFIED REGISTERED

## 2020-09-17 PROCEDURE — 71000039 HC RECOVERY, EACH ADD'L HOUR: Performed by: ORTHOPAEDIC SURGERY

## 2020-09-17 PROCEDURE — 25000003 PHARM REV CODE 250: Performed by: ANESTHESIOLOGY

## 2020-09-17 PROCEDURE — 25000003 PHARM REV CODE 250: Performed by: ORTHOPAEDIC SURGERY

## 2020-09-17 PROCEDURE — C1713 ANCHOR/SCREW BN/BN,TIS/BN: HCPCS | Performed by: ORTHOPAEDIC SURGERY

## 2020-09-17 PROCEDURE — 37000009 HC ANESTHESIA EA ADD 15 MINS: Performed by: ORTHOPAEDIC SURGERY

## 2020-09-17 PROCEDURE — 94640 AIRWAY INHALATION TREATMENT: CPT

## 2020-09-17 PROCEDURE — 64447 NJX AA&/STRD FEMORAL NRV IMG: CPT | Mod: LT | Performed by: SPECIALIST

## 2020-09-17 PROCEDURE — 99900035 HC TECH TIME PER 15 MIN (STAT)

## 2020-09-17 PROCEDURE — 94761 N-INVAS EAR/PLS OXIMETRY MLT: CPT

## 2020-09-17 PROCEDURE — 63600175 PHARM REV CODE 636 W HCPCS: Performed by: ANESTHESIOLOGY

## 2020-09-17 PROCEDURE — 25000003 PHARM REV CODE 250: Performed by: SPECIALIST

## 2020-09-17 PROCEDURE — 37000008 HC ANESTHESIA 1ST 15 MINUTES: Performed by: ORTHOPAEDIC SURGERY

## 2020-09-17 PROCEDURE — 36000711: Mod: LT | Performed by: ORTHOPAEDIC SURGERY

## 2020-09-17 PROCEDURE — 36000710: Mod: LT | Performed by: ORTHOPAEDIC SURGERY

## 2020-09-17 DEVICE — PATELLA COMPONENT 3 PEG 31X8MM: Type: IMPLANTABLE DEVICE | Site: KNEE | Status: FUNCTIONAL

## 2020-09-17 DEVICE — IMPLANTABLE DEVICE
Type: IMPLANTABLE DEVICE | Site: KNEE | Status: FUNCTIONAL
Brand: VANGUARD® KNEE SYSTEM

## 2020-09-17 DEVICE — CEMENT BONE STD: Type: IMPLANTABLE DEVICE | Site: KNEE | Status: FUNCTIONAL

## 2020-09-17 DEVICE — TIBIAL TRAY CRUCIATE 67MM: Type: IMPLANTABLE DEVICE | Site: KNEE | Status: FUNCTIONAL

## 2020-09-17 RX ORDER — LIDOCAINE HYDROCHLORIDE 10 MG/ML
0.5 INJECTION, SOLUTION EPIDURAL; INFILTRATION; INTRACAUDAL; PERINEURAL ONCE
Status: DISCONTINUED | OUTPATIENT
Start: 2020-09-17 | End: 2020-09-17 | Stop reason: HOSPADM

## 2020-09-17 RX ORDER — GABAPENTIN 300 MG/1
300 CAPSULE ORAL 3 TIMES DAILY
Status: DISCONTINUED | OUTPATIENT
Start: 2020-09-17 | End: 2020-09-19 | Stop reason: HOSPADM

## 2020-09-17 RX ORDER — PHENYLEPHRINE HYDROCHLORIDE 10 MG/ML
INJECTION INTRAVENOUS
Status: DISCONTINUED | OUTPATIENT
Start: 2020-09-17 | End: 2020-09-17

## 2020-09-17 RX ORDER — TRANEXAMIC ACID 100 MG/ML
INJECTION, SOLUTION INTRAVENOUS
Status: DISCONTINUED | OUTPATIENT
Start: 2020-09-17 | End: 2020-09-17 | Stop reason: HOSPADM

## 2020-09-17 RX ORDER — HYDROCODONE BITARTRATE AND ACETAMINOPHEN 10; 325 MG/1; MG/1
1 TABLET ORAL EVERY 4 HOURS PRN
Status: DISCONTINUED | OUTPATIENT
Start: 2020-09-17 | End: 2020-09-19 | Stop reason: HOSPADM

## 2020-09-17 RX ORDER — SODIUM CHLORIDE 0.9 % (FLUSH) 0.9 %
3 SYRINGE (ML) INJECTION
Status: DISCONTINUED | OUTPATIENT
Start: 2020-09-17 | End: 2020-09-19 | Stop reason: HOSPADM

## 2020-09-17 RX ORDER — IBUPROFEN 200 MG
1 TABLET ORAL DAILY
Status: DISCONTINUED | OUTPATIENT
Start: 2020-09-18 | End: 2020-09-19 | Stop reason: HOSPADM

## 2020-09-17 RX ORDER — CELECOXIB 200 MG/1
200 CAPSULE ORAL 2 TIMES DAILY
Status: DISCONTINUED | OUTPATIENT
Start: 2020-09-17 | End: 2020-09-19 | Stop reason: HOSPADM

## 2020-09-17 RX ORDER — FAMOTIDINE 20 MG/1
20 TABLET, FILM COATED ORAL DAILY
Status: DISCONTINUED | OUTPATIENT
Start: 2020-09-18 | End: 2020-09-19 | Stop reason: HOSPADM

## 2020-09-17 RX ORDER — CEFAZOLIN SODIUM 2 G/50ML
2 SOLUTION INTRAVENOUS
Status: COMPLETED | OUTPATIENT
Start: 2020-09-17 | End: 2020-09-18

## 2020-09-17 RX ORDER — PROPOFOL 10 MG/ML
VIAL (ML) INTRAVENOUS
Status: DISCONTINUED | OUTPATIENT
Start: 2020-09-17 | End: 2020-09-17

## 2020-09-17 RX ORDER — SODIUM CHLORIDE, SODIUM LACTATE, POTASSIUM CHLORIDE, CALCIUM CHLORIDE 600; 310; 30; 20 MG/100ML; MG/100ML; MG/100ML; MG/100ML
INJECTION, SOLUTION INTRAVENOUS CONTINUOUS
Status: DISCONTINUED | OUTPATIENT
Start: 2020-09-17 | End: 2020-09-17

## 2020-09-17 RX ORDER — DIPHENHYDRAMINE HYDROCHLORIDE 50 MG/ML
25 INJECTION INTRAMUSCULAR; INTRAVENOUS EVERY 6 HOURS PRN
Status: DISCONTINUED | OUTPATIENT
Start: 2020-09-17 | End: 2020-09-17 | Stop reason: HOSPADM

## 2020-09-17 RX ORDER — ALBUTEROL SULFATE 2.5 MG/.5ML
2.5 SOLUTION RESPIRATORY (INHALATION)
Status: COMPLETED | OUTPATIENT
Start: 2020-09-17 | End: 2020-09-17

## 2020-09-17 RX ORDER — MIRTAZAPINE 15 MG/1
15 TABLET, FILM COATED ORAL NIGHTLY
Status: DISCONTINUED | OUTPATIENT
Start: 2020-09-17 | End: 2020-09-19 | Stop reason: HOSPADM

## 2020-09-17 RX ORDER — OXYCODONE HYDROCHLORIDE 5 MG/1
5 TABLET ORAL
Status: DISCONTINUED | OUTPATIENT
Start: 2020-09-17 | End: 2020-09-17 | Stop reason: HOSPADM

## 2020-09-17 RX ORDER — SODIUM CHLORIDE 9 MG/ML
INJECTION, SOLUTION INTRAVENOUS CONTINUOUS
Status: DISCONTINUED | OUTPATIENT
Start: 2020-09-17 | End: 2020-09-17

## 2020-09-17 RX ORDER — MEPERIDINE HYDROCHLORIDE 25 MG/ML
12.5 INJECTION INTRAMUSCULAR; INTRAVENOUS; SUBCUTANEOUS ONCE AS NEEDED
Status: DISCONTINUED | OUTPATIENT
Start: 2020-09-17 | End: 2020-09-17 | Stop reason: HOSPADM

## 2020-09-17 RX ORDER — QUETIAPINE FUMARATE 25 MG/1
50 TABLET, FILM COATED ORAL NIGHTLY
Status: DISCONTINUED | OUTPATIENT
Start: 2020-09-17 | End: 2020-09-19 | Stop reason: HOSPADM

## 2020-09-17 RX ORDER — MUPIROCIN 20 MG/G
1 OINTMENT TOPICAL 2 TIMES DAILY
Status: DISCONTINUED | OUTPATIENT
Start: 2020-09-17 | End: 2020-09-19 | Stop reason: HOSPADM

## 2020-09-17 RX ORDER — ACETAMINOPHEN 500 MG
1000 TABLET ORAL
Status: COMPLETED | OUTPATIENT
Start: 2020-09-17 | End: 2020-09-17

## 2020-09-17 RX ORDER — CEFAZOLIN SODIUM 2 G/50ML
2 SOLUTION INTRAVENOUS
Status: COMPLETED | OUTPATIENT
Start: 2020-09-17 | End: 2020-09-17

## 2020-09-17 RX ORDER — ONDANSETRON 8 MG/1
8 TABLET, ORALLY DISINTEGRATING ORAL EVERY 8 HOURS PRN
Status: DISCONTINUED | OUTPATIENT
Start: 2020-09-17 | End: 2020-09-19 | Stop reason: HOSPADM

## 2020-09-17 RX ORDER — HYDROCODONE BITARTRATE AND ACETAMINOPHEN 5; 325 MG/1; MG/1
1 TABLET ORAL EVERY 4 HOURS PRN
Status: DISCONTINUED | OUTPATIENT
Start: 2020-09-17 | End: 2020-09-19 | Stop reason: HOSPADM

## 2020-09-17 RX ORDER — HYDROMORPHONE HYDROCHLORIDE 1 MG/ML
0.5 INJECTION, SOLUTION INTRAMUSCULAR; INTRAVENOUS; SUBCUTANEOUS
Status: DISCONTINUED | OUTPATIENT
Start: 2020-09-17 | End: 2020-09-17

## 2020-09-17 RX ORDER — POLYETHYLENE GLYCOL 3350 17 G/17G
17 POWDER, FOR SOLUTION ORAL DAILY
Status: DISCONTINUED | OUTPATIENT
Start: 2020-09-18 | End: 2020-09-19 | Stop reason: HOSPADM

## 2020-09-17 RX ORDER — SODIUM CHLORIDE 0.9 % (FLUSH) 0.9 %
5 SYRINGE (ML) INJECTION
Status: DISCONTINUED | OUTPATIENT
Start: 2020-09-17 | End: 2020-09-19 | Stop reason: HOSPADM

## 2020-09-17 RX ORDER — HYDROMORPHONE HYDROCHLORIDE 2 MG/ML
0.4 INJECTION, SOLUTION INTRAMUSCULAR; INTRAVENOUS; SUBCUTANEOUS EVERY 5 MIN PRN
Status: DISCONTINUED | OUTPATIENT
Start: 2020-09-17 | End: 2020-09-17 | Stop reason: HOSPADM

## 2020-09-17 RX ORDER — LIDOCAINE HCL/PF 100 MG/5ML
SYRINGE (ML) INTRAVENOUS
Status: DISCONTINUED | OUTPATIENT
Start: 2020-09-17 | End: 2020-09-17

## 2020-09-17 RX ORDER — PROPOFOL 10 MG/ML
VIAL (ML) INTRAVENOUS CONTINUOUS PRN
Status: DISCONTINUED | OUTPATIENT
Start: 2020-09-17 | End: 2020-09-17

## 2020-09-17 RX ORDER — ONDANSETRON 2 MG/ML
4 INJECTION INTRAMUSCULAR; INTRAVENOUS DAILY PRN
Status: DISCONTINUED | OUTPATIENT
Start: 2020-09-17 | End: 2020-09-17 | Stop reason: HOSPADM

## 2020-09-17 RX ORDER — ROPIVACAINE HYDROCHLORIDE 5 MG/ML
INJECTION, SOLUTION EPIDURAL; INFILTRATION; PERINEURAL
Status: DISCONTINUED | OUTPATIENT
Start: 2020-09-17 | End: 2020-09-17

## 2020-09-17 RX ORDER — HYDROMORPHONE HYDROCHLORIDE 1 MG/ML
0.5 INJECTION, SOLUTION INTRAMUSCULAR; INTRAVENOUS; SUBCUTANEOUS
Status: DISCONTINUED | OUTPATIENT
Start: 2020-09-17 | End: 2020-09-19 | Stop reason: HOSPADM

## 2020-09-17 RX ORDER — DEXTROSE MONOHYDRATE AND SODIUM CHLORIDE 5; .9 G/100ML; G/100ML
INJECTION, SOLUTION INTRAVENOUS CONTINUOUS
Status: DISCONTINUED | OUTPATIENT
Start: 2020-09-17 | End: 2020-09-18

## 2020-09-17 RX ORDER — OXCARBAZEPINE 150 MG/1
150 TABLET, FILM COATED ORAL 2 TIMES DAILY
Status: DISCONTINUED | OUTPATIENT
Start: 2020-09-17 | End: 2020-09-19 | Stop reason: HOSPADM

## 2020-09-17 RX ORDER — NAPROXEN SODIUM 220 MG/1
81 TABLET, FILM COATED ORAL 2 TIMES DAILY
Status: DISCONTINUED | OUTPATIENT
Start: 2020-09-17 | End: 2020-09-18

## 2020-09-17 RX ADMIN — MUPIROCIN 1 G: 20 OINTMENT TOPICAL at 08:09

## 2020-09-17 RX ADMIN — OXCARBAZEPINE 150 MG: 150 TABLET, FILM COATED ORAL at 08:09

## 2020-09-17 RX ADMIN — HYDROMORPHONE HYDROCHLORIDE 0.4 MG: 2 INJECTION INTRAMUSCULAR; INTRAVENOUS; SUBCUTANEOUS at 02:09

## 2020-09-17 RX ADMIN — PHENYLEPHRINE HYDROCHLORIDE 200 MCG: 10 INJECTION INTRAVENOUS at 12:09

## 2020-09-17 RX ADMIN — ROPIVACAINE HYDROCHLORIDE 25 ML: 5 INJECTION, SOLUTION EPIDURAL; INFILTRATION; PERINEURAL at 10:09

## 2020-09-17 RX ADMIN — ASPIRIN 81 MG CHEWABLE TABLET 81 MG: 81 TABLET CHEWABLE at 08:09

## 2020-09-17 RX ADMIN — PHENYLEPHRINE HYDROCHLORIDE 200 MCG: 10 INJECTION INTRAVENOUS at 11:09

## 2020-09-17 RX ADMIN — DEXTROSE AND SODIUM CHLORIDE: 5; .9 INJECTION, SOLUTION INTRAVENOUS at 06:09

## 2020-09-17 RX ADMIN — OXYCODONE 5 MG: 5 TABLET ORAL at 02:09

## 2020-09-17 RX ADMIN — CELECOXIB 200 MG: 200 CAPSULE ORAL at 08:09

## 2020-09-17 RX ADMIN — MIRTAZAPINE 15 MG: 15 TABLET, FILM COATED ORAL at 08:09

## 2020-09-17 RX ADMIN — GABAPENTIN 300 MG: 300 CAPSULE ORAL at 08:09

## 2020-09-17 RX ADMIN — QUETIAPINE FUMARATE 50 MG: 25 TABLET ORAL at 08:09

## 2020-09-17 RX ADMIN — CEFAZOLIN SODIUM 2 G: 2 SOLUTION INTRAVENOUS at 11:09

## 2020-09-17 RX ADMIN — LIDOCAINE HYDROCHLORIDE 100 MG: 20 INJECTION, SOLUTION INTRAVENOUS at 12:09

## 2020-09-17 RX ADMIN — ALBUTEROL SULFATE 2.5 MG: 2.5 SOLUTION RESPIRATORY (INHALATION) at 09:09

## 2020-09-17 RX ADMIN — ACETAMINOPHEN 1000 MG: 500 TABLET, FILM COATED ORAL at 09:09

## 2020-09-17 RX ADMIN — SODIUM CHLORIDE, SODIUM LACTATE, POTASSIUM CHLORIDE, AND CALCIUM CHLORIDE: 600; 310; 30; 20 INJECTION, SOLUTION INTRAVENOUS at 10:09

## 2020-09-17 RX ADMIN — PROPOFOL 75 MCG/KG/MIN: 10 INJECTION, EMULSION INTRAVENOUS at 11:09

## 2020-09-17 RX ADMIN — PROPOFOL 50 MG: 10 INJECTION, EMULSION INTRAVENOUS at 11:09

## 2020-09-17 RX ADMIN — CEFAZOLIN SODIUM 2 G: 2 SOLUTION INTRAVENOUS at 07:09

## 2020-09-17 RX ADMIN — HYDROCODONE BITARTRATE AND ACETAMINOPHEN 1 TABLET: 5; 325 TABLET ORAL at 08:09

## 2020-09-17 RX ADMIN — SODIUM CHLORIDE, SODIUM LACTATE, POTASSIUM CHLORIDE, AND CALCIUM CHLORIDE: 600; 310; 30; 20 INJECTION, SOLUTION INTRAVENOUS at 11:09

## 2020-09-17 RX ADMIN — ONDANSETRON 4 MG: 2 INJECTION INTRAMUSCULAR; INTRAVENOUS at 02:09

## 2020-09-17 NOTE — ANESTHESIA PROCEDURE NOTES
Peripheral Block    Patient location during procedure: holding area   Block not for primary anesthetic.  Reason for block: at surgeon's request and post-op pain management   Post-op Pain Location: L KNEE  Start time: 9/17/2020 10:34 AM  Timeout: 9/17/2020 10:34 AM   End time: 9/17/2020 10:40 AM    Staffing  Authorizing Provider: Jignesh Elkins MD  Performing Provider: Jignesh Elkins MD    Preanesthetic Checklist  Completed: patient identified, site marked, surgical consent, pre-op evaluation, timeout performed, IV checked, risks and benefits discussed and monitors and equipment checked  Peripheral Block  Patient position: supine  Prep: ChloraPrep and site prepped and draped  Patient monitoring: heart rate, cardiac monitor, continuous pulse ox and frequent blood pressure checks  Block type: adductor canal  Laterality: left  Injection technique: single shot  Needle  Needle type: Stimuplex   Needle gauge: 22 G  Needle localization: anatomical landmarks and ultrasound guidance   -ultrasound image captured on disc.  Assessment  Injection assessment: negative aspiration, negative parasthesia and local visualized surrounding nerve  Paresthesia pain: none  Heart rate change: no  Slow fractionated injection: yes

## 2020-09-17 NOTE — TRANSFER OF CARE
"Anesthesia Transfer of Care Note    Patient: Alayna Anderson    Procedure(s) Performed: Procedure(s) (LRB):  ARTHROPLASTY, KNEE, TOTAL (Left)    Patient location: PACU    Anesthesia Type: spinal    Transport from OR: Transported from OR on 2-3 L/min O2 by NC with adequate spontaneous ventilation    Post pain: adequate analgesia    Post assessment: no apparent anesthetic complications    Post vital signs: stable    Level of consciousness: awake    Nausea/Vomiting: no nausea/vomiting    Complications: none    Transfer of care protocol was followed      Last vitals:   Visit Vitals  /68   Pulse 72   Temp 36.2 °C (97.2 °F)   Resp 18   Ht 5' 2" (1.575 m)   Wt 59 kg (130 lb)   SpO2 96%   Breastfeeding No   BMI 23.78 kg/m²     "

## 2020-09-17 NOTE — INTERVAL H&P NOTE
The patient has been examined and the H&P has been reviewed:    I concur with the findings and no changes have occurred since H&P was written.    Surgery risks, benefits and alternative options discussed and understood by patient/family.          Active Hospital Problems    Diagnosis  POA    *OA (osteoarthritis) of knee [M17.10]  Yes      Resolved Hospital Problems   No resolved problems to display.

## 2020-09-17 NOTE — OP NOTE
DATE OF PROCEDURE: 09/17/2020     CHIEF COMPLAINT AND PRESENT ILLNESS:   59-year-old with intolerable pain deformity left knee exhausted non operative measures elected for left knee replacement.  Significant risks especially with her past medical history and current medical condition but having difficulty ambulating she is with a walker consequently elected for left knee replacement.     PREOPERATIVE DIAGNOSIS:  Osteoarthritis left knee     POSTOPERATIVE DIAGNOSIS:   same     PROCEDURES PERFORMED:   left knee replacement Biomet vanguard 60/67.5/10/31    SURGEON:  Adrián Romo M.D.     ASSISTANT:  Gina Culver CST.     COMPLICATIONS:   none     ANESTHESIA:  Spinal     BLOOD LOSS:   59     IMPLANTS:  As above     PROCEDURE IN DETAIL:   patient was brought to the operating room and a spinal anesthesia without difficulty left lower extremities prepped in usual fashion tourniquet applied 250 mm mercury after Esmarch.  Using standard midline incision sharp dissection made down extensor mechanism a standard medial parapatellar arthrotomy was performed medial fat pad was removed and a extensive medial release performed because she had a fixed varus deformity.  The knee corrected well then.  Anterior posterior cruciate ligaments removed mediolateral meniscal remnants removed.  She had a significant gouge and her lateral femoral condyle.  She could had a Uni.  A tibial cut was made per midshaft tibia sizing was 67.5.  Attention turned to the femur with intramedullary guide a 5 degree provisional distal cut was performed.  Sizing was 60 so with the 60 and the tensor in flexion anterior-posterior cuts performed flexion gap was symmetric at 10 attention turned extension gap again with the tensor 10 mm distal cut was performed flexion-extension gaps were symmetric at 10 good alignment good stability.  Chamber cuts performed notch cut was performed with a floating trial 0-135 degrees range of motion.  Rotation was marked  tibia was punched prepatellar thickness she was then about 22.  Post patellar thickness 22.  That was with a 31 button.  The appropriate components opened using its using good cement technique all components were cemented.  Excess cement was removed pulse lavage irrigation was used.  Final 10 mm insert placed.  Again excellent range of motion stability.  1.  Vicryl using parapatellar arthrotomy post closure 0-135 degrees range of motion to 0 Vicryl subcutaneously staples on the skin Exparel tranexamic acid and Marcaine were instilled the soft tissues placed in bulky sterile dressing tourniquet released 59 min tolerated procedure well brought to come sterile fashion    This performed with a poor recognition system

## 2020-09-17 NOTE — ANESTHESIA POSTPROCEDURE EVALUATION
Anesthesia Post Evaluation    Patient: Alayna Anderson    Procedure(s) Performed: Procedure(s) (LRB):  ARTHROPLASTY, KNEE, TOTAL (Left)    Final Anesthesia Type: spinal    Patient location during evaluation: PACU  Level of consciousness: awake and alert and oriented  Post-procedure vital signs: reviewed and stable  Pain management: adequate  Airway patency: patent    PONV status at discharge: No PONV  Anesthetic complications: no      Cardiovascular status: blood pressure returned to baseline and hemodynamically stable  Respiratory status: unassisted, spontaneous ventilation and nasal cannula  Hydration status: euvolemic  Follow-up not needed.          Vitals Value Taken Time   /66 09/17/20 1347   Temp 36.3 °C (97.4 °F) 09/17/20 1250   Pulse 66 09/17/20 1350   Resp 18 09/17/20 1330   SpO2 99 % 09/17/20 1350   Vitals shown include unvalidated device data.      No case tracking events are documented in the log.      Pain/Jose Alberto Score: Pain Rating Prior to Med Admin: 0 (9/17/2020 12:44 PM)  Jose Alberto Score: 8 (9/17/2020  1:15 PM)

## 2020-09-17 NOTE — TELEPHONE ENCOUNTER
Patient has been admitted into hospital, was unable to contact to inform her of negative COVID result.----- Message from Sujata Faria PA-C sent at 9/15/2020  8:20 AM CDT -----  Please inform pt that COVID test was negative.

## 2020-09-17 NOTE — PROGRESS NOTES
UA positive for bacteria and elevated WBC's. Pt states she has not taken antibiotics and is asymptomatic. However she does have vaginal discharge and believes she has a yeast infection. Dr. Romo notified. Ok to proceed with surgery.

## 2020-09-17 NOTE — OR NURSING
Pt doing well in PACU. VSS on room air and afebrile. Awake and alert. Pt reports minimal pain after medication given. Very active in stretcher. Pt instructed multiple times to keep leg straight and in stretcher. Will keep educating. Small amount of drainage noted to bandage upon discharge from PACU.

## 2020-09-17 NOTE — ANESTHESIA PROCEDURE NOTES
Spinal    Diagnosis: L KNEE  Patient location during procedure: holding area  Start time: 9/17/2020 11:00 AM  Timeout: 9/17/2020 11:00 AM  End time: 9/17/2020 11:06 AM    Staffing  Authorizing Provider: Jignesh Elkins MD  Performing Provider: Jignesh Elkins MD    Preanesthetic Checklist  Completed: patient identified, site marked, surgical consent, pre-op evaluation, timeout performed, IV checked, risks and benefits discussed and monitors and equipment checked  Spinal Block  Patient position: sitting  Prep: ChloraPrep  Patient monitoring: heart rate, cardiac monitor, continuous pulse ox and frequent blood pressure checks  Approach: right paramedian  Location: L3-4  Injection technique: single shot  CSF Fluid: clear free-flowing CSF  Needle  Needle type: pencil-tip   Needle gauge: 25 G  Needle length: 3.5 in  Additional Documentation: incremental injection, negative aspiration for heme and no paresthesia on injection  Needle localization: anatomical landmarks  Assessment  Sensory level: T5   Dermatomal levels determined by alcohol wipe and pinch or prick  Ease of block: easy  Patient's tolerance of the procedure: comfortable throughout block and no complaints

## 2020-09-18 PROBLEM — M17.9 OA (OSTEOARTHRITIS) OF KNEE: Status: RESOLVED | Noted: 2020-09-17 | Resolved: 2020-09-18

## 2020-09-18 LAB
ANION GAP SERPL CALC-SCNC: 9 MMOL/L (ref 8–16)
BASOPHILS # BLD AUTO: 0.02 K/UL (ref 0–0.2)
BASOPHILS NFR BLD: 0.3 % (ref 0–1.9)
BUN SERPL-MCNC: 8 MG/DL (ref 6–20)
CALCIUM SERPL-MCNC: 8.4 MG/DL (ref 8.7–10.5)
CHLORIDE SERPL-SCNC: 99 MMOL/L (ref 95–110)
CO2 SERPL-SCNC: 23 MMOL/L (ref 23–29)
CREAT SERPL-MCNC: 0.6 MG/DL (ref 0.5–1.4)
DIFFERENTIAL METHOD: ABNORMAL
EOSINOPHIL # BLD AUTO: 0.1 K/UL (ref 0–0.5)
EOSINOPHIL NFR BLD: 0.9 % (ref 0–8)
ERYTHROCYTE [DISTWIDTH] IN BLOOD BY AUTOMATED COUNT: 13.4 % (ref 11.5–14.5)
EST. GFR  (AFRICAN AMERICAN): >60 ML/MIN/1.73 M^2
EST. GFR  (NON AFRICAN AMERICAN): >60 ML/MIN/1.73 M^2
GLUCOSE SERPL-MCNC: 122 MG/DL (ref 70–110)
HCT VFR BLD AUTO: 35.7 % (ref 37–48.5)
HGB BLD-MCNC: 12 G/DL (ref 12–16)
IMM GRANULOCYTES # BLD AUTO: 0.02 K/UL (ref 0–0.04)
IMM GRANULOCYTES NFR BLD AUTO: 0.3 % (ref 0–0.5)
LYMPHOCYTES # BLD AUTO: 1.8 K/UL (ref 1–4.8)
LYMPHOCYTES NFR BLD: 26.1 % (ref 18–48)
MCH RBC QN AUTO: 31.1 PG (ref 27–31)
MCHC RBC AUTO-ENTMCNC: 33.6 G/DL (ref 32–36)
MCV RBC AUTO: 93 FL (ref 82–98)
MONOCYTES # BLD AUTO: 0.8 K/UL (ref 0.3–1)
MONOCYTES NFR BLD: 11.3 % (ref 4–15)
NEUTROPHILS # BLD AUTO: 4.2 K/UL (ref 1.8–7.7)
NEUTROPHILS NFR BLD: 61.1 % (ref 38–73)
NRBC BLD-RTO: 0 /100 WBC
PLATELET # BLD AUTO: 118 K/UL (ref 150–350)
PMV BLD AUTO: 11 FL (ref 9.2–12.9)
POTASSIUM SERPL-SCNC: 4.6 MMOL/L (ref 3.5–5.1)
RBC # BLD AUTO: 3.86 M/UL (ref 4–5.4)
SODIUM SERPL-SCNC: 131 MMOL/L (ref 136–145)
WBC # BLD AUTO: 6.9 K/UL (ref 3.9–12.7)

## 2020-09-18 PROCEDURE — 25000003 PHARM REV CODE 250: Performed by: ORTHOPAEDIC SURGERY

## 2020-09-18 PROCEDURE — 94761 N-INVAS EAR/PLS OXIMETRY MLT: CPT

## 2020-09-18 PROCEDURE — 97165 OT EVAL LOW COMPLEX 30 MIN: CPT

## 2020-09-18 PROCEDURE — 85025 COMPLETE CBC W/AUTO DIFF WBC: CPT

## 2020-09-18 PROCEDURE — 97530 THERAPEUTIC ACTIVITIES: CPT

## 2020-09-18 PROCEDURE — 80048 BASIC METABOLIC PNL TOTAL CA: CPT

## 2020-09-18 PROCEDURE — 97162 PT EVAL MOD COMPLEX 30 MIN: CPT

## 2020-09-18 PROCEDURE — 63600175 PHARM REV CODE 636 W HCPCS: Performed by: ORTHOPAEDIC SURGERY

## 2020-09-18 PROCEDURE — 97110 THERAPEUTIC EXERCISES: CPT

## 2020-09-18 PROCEDURE — 97535 SELF CARE MNGMENT TRAINING: CPT

## 2020-09-18 PROCEDURE — 97116 GAIT TRAINING THERAPY: CPT | Mod: 59

## 2020-09-18 PROCEDURE — 36415 COLL VENOUS BLD VENIPUNCTURE: CPT

## 2020-09-18 RX ORDER — HYDROCODONE BITARTRATE AND ACETAMINOPHEN 10; 325 MG/1; MG/1
1 TABLET ORAL EVERY 6 HOURS PRN
Qty: 50 TABLET | Refills: 0 | Status: SHIPPED | OUTPATIENT
Start: 2020-09-18 | End: 2021-07-02

## 2020-09-18 RX ADMIN — ONDANSETRON 8 MG: 8 TABLET, ORALLY DISINTEGRATING ORAL at 08:09

## 2020-09-18 RX ADMIN — QUETIAPINE FUMARATE 50 MG: 25 TABLET ORAL at 08:09

## 2020-09-18 RX ADMIN — OXCARBAZEPINE 150 MG: 150 TABLET, FILM COATED ORAL at 09:09

## 2020-09-18 RX ADMIN — HYDROCODONE BITARTRATE AND ACETAMINOPHEN 1 TABLET: 10; 325 TABLET ORAL at 02:09

## 2020-09-18 RX ADMIN — POLYETHYLENE GLYCOL 3350 17 G: 17 POWDER, FOR SOLUTION ORAL at 09:09

## 2020-09-18 RX ADMIN — MUPIROCIN 1 G: 20 OINTMENT TOPICAL at 08:09

## 2020-09-18 RX ADMIN — CELECOXIB 200 MG: 200 CAPSULE ORAL at 08:09

## 2020-09-18 RX ADMIN — HYDROMORPHONE HYDROCHLORIDE 0.5 MG: 1 INJECTION, SOLUTION INTRAMUSCULAR; INTRAVENOUS; SUBCUTANEOUS at 12:09

## 2020-09-18 RX ADMIN — MUPIROCIN 1 G: 20 OINTMENT TOPICAL at 09:09

## 2020-09-18 RX ADMIN — HYDROCODONE BITARTRATE AND ACETAMINOPHEN 1 TABLET: 10; 325 TABLET ORAL at 07:09

## 2020-09-18 RX ADMIN — CELECOXIB 200 MG: 200 CAPSULE ORAL at 09:09

## 2020-09-18 RX ADMIN — MIRTAZAPINE 15 MG: 15 TABLET, FILM COATED ORAL at 08:09

## 2020-09-18 RX ADMIN — FAMOTIDINE 20 MG: 20 TABLET ORAL at 09:09

## 2020-09-18 RX ADMIN — GABAPENTIN 300 MG: 300 CAPSULE ORAL at 02:09

## 2020-09-18 RX ADMIN — HYDROCODONE BITARTRATE AND ACETAMINOPHEN 1 TABLET: 10; 325 TABLET ORAL at 03:09

## 2020-09-18 RX ADMIN — CEFAZOLIN SODIUM 2 G: 2 SOLUTION INTRAVENOUS at 02:09

## 2020-09-18 RX ADMIN — GABAPENTIN 300 MG: 300 CAPSULE ORAL at 09:09

## 2020-09-18 RX ADMIN — GABAPENTIN 300 MG: 300 CAPSULE ORAL at 08:09

## 2020-09-18 RX ADMIN — HYDROMORPHONE HYDROCHLORIDE 0.5 MG: 1 INJECTION, SOLUTION INTRAMUSCULAR; INTRAVENOUS; SUBCUTANEOUS at 06:09

## 2020-09-18 RX ADMIN — HYDROCODONE BITARTRATE AND ACETAMINOPHEN 1 TABLET: 10; 325 TABLET ORAL at 09:09

## 2020-09-18 RX ADMIN — ONDANSETRON 8 MG: 8 TABLET, ORALLY DISINTEGRATING ORAL at 12:09

## 2020-09-18 RX ADMIN — OXCARBAZEPINE 150 MG: 150 TABLET, FILM COATED ORAL at 08:09

## 2020-09-18 RX ADMIN — ASPIRIN 81 MG CHEWABLE TABLET 81 MG: 81 TABLET CHEWABLE at 09:09

## 2020-09-18 NOTE — PT/OT/SLP EVAL
Physical Therapy Evaluation and Treatment    Patient Name:  Alayna Anderson   MRN:  [id number]    Recommendations:     Discharge Recommendations:  home, home health PT, home health OT   Discharge Equipment Recommendations: walker, rolling, 3-in-1 commode   Barriers to discharge: None    Assessment:     Alayna Anderson is a 59 y.o. female admitted with a medical diagnosis of OA (osteoarthritis) of knee.  She presents with the following impairments/functional limitations:  weakness, impaired endurance, impaired self care skills, impaired functional mobilty, gait instability, impaired balance, decreased lower extremity function, pain, decreased ROM, edema, orthopedic precautions.    PT orders received. PT evaluation completed and goals/POC established. Pt tolerated evaluation well with no adverse reactions. Pt's incision actively bleeding. Notified Charity (ortho nurse navigator), who re-dressed incision. Pt performed bed mobility, transfers, and ambulation x 150 ft with CGA and rolling walker. L knee flexion/extension AROM = 15 deg (lacking full extension) to 50 deg. Pt performed HEP with verbal and tactile cues for exercises/technique. Educated patient on TKA precautions, safe use of rolling walker, home exercise program, positioning after knee replacement (no pillow under knee), importance of mobility, and PT plan of care. Pt verbalized understanding. Pt will benefit from skilled PT services to address impairments and functional limitations.    Rehab Prognosis: Good; patient would benefit from acute skilled PT services to address these deficits and reach maximum level of function.    Recent Surgery: Procedure(s) (LRB):  ARTHROPLASTY, KNEE, TOTAL (Left) 1 Day Post-Op    Plan:     During this hospitalization, patient to be seen (QD - BID) to address the identified rehab impairments via gait training, therapeutic activities, therapeutic exercises and progress toward the following goals:    · Plan of Care Expires:   20    Subjective     Chief Complaint: L knee pain  Patient/Family Comments/goals: No goal stated.  Pain/Comfort:  · Pain Rating 1: 9/10  · Location - Side 1: Left  · Location - Orientation 1: generalized  · Location 1: knee  · Pain Addressed 1: Reposition, Distraction, Cessation of Activity  · Pain Rating Post-Intervention 1: 9/10    Patients cultural, spiritual, Latter day conflicts given the current situation: no(None stated)    Living Environment:  · Pt lives alone, but will be discharging to her sister's house. Sister lives in a single story house with no steps.   · Pt has a tub shower.   · DME owned: [employer]  · Upon discharge, patient will have assistance from her sister.  Prior level of function:  · Ambulation: Mod I with rollator  · ADL's: Mod I with rollator  · IADLs: Mod I with rollator  · Falls: None reported.    Objective:     Communicated with RN (Brian) prior to session.  Patient found supine with cryotherapy, FCD, peripheral IV  upon PT entry to room. Lower portion of bed flexed and pt's knee in flexion upon entering room. FCDs on patient, but pump turned off upon entering room. Pt's reports she has not done CPM yet.    General Precautions: Standard, fall   Orthopedic Precautions:LLE weight bearing as tolerated   Braces: N/A     Exams:  · Cognition:   · Patient is oriented to name, , date, place, situation.  · Pt follows approximately 100% of one step commands.    · Mood: Pleasant and cooperative.   · Musculoskeletal:  · Posture: Protective guarding surgical joint  · LE ROM/Strength: 5/5 bilateral hip flexion, nonsurgical knee extension, bilateral ankle dorsiflexion. AROM surgical knee difficult to accurately assess with large bulky dressing; L knee flexion/extension AROM measured = 15 deg (lacking full extension) to 50 deg. Surgical knee strength grossly 5/5 knee flexion and 3-/5 knee extension.  · Neuromuscular:  · Sensation: Intact to light touch bilateral LEs. Pt denied paresthesias.    · Coordination/Tone/Reflexes: No impairments identified with functional mobility. No formal testing performed.   · Balance: contact guard assistance for dynamic standing with bilateral UE support.   · Visual-vestibular: No impairments identified with functional mobility. No formal testing performed.  · Integument: Incision actively bleeding during session. Notified Charity (RN) and Charity re-dressed incision.  · Cardiopulmonary:  · Edema: Mild surgical extremity.    · Color/temperature/pulses: WNL    Functional Mobility: Non-slip socks and gait belt donned prior to mobility. Surgical mask donned for ambulation in hallway per current infection control policy.  · Bed Mobility:     · Supine to Sit: contact guard assistance  · Transfers:     · Sit to Stand: contact guard assistance with rolling walker  · Pt required verbal cues for hand placement  · Bed to Chair: contact guard assistance with rolling walker  · Gait: 150 ft with rolling walker and contact guard assistance.  · Pt required verbal cues for rolling walker management and step-through gait pattern.  · Demonstrated gait deviations of decreased stride length with step to gait pattern, decreased surgical knee flexion, decreased surgical heel strike/toe off, and increased double limb support time.    Therapeutic Activities and Exercises:  Pt performed supine therapeutic exercises including ankle pumps, glut sets, quad sets, SAQ, hip abd/add, SLR, and heel slides x 10 reps with verbal and tactile cues.    PT educated patient re:   · PT plan of care/role of PT  · Safety with OOB mobility  · Use of RW  · Positioning of LE and use of ice  · Orthopedic precautions  · Gait deviations  · Therapeutic exercises  · Discharge disposition    Pt verbalized understanding     AM-PAC 6 CLICK MOBILITY  Total Score:17     Patient left up in chair with all lines intact and call button in reach.    GOALS:   Multidisciplinary Problems     Physical Therapy Goals        Problem: Physical  Therapy Goal    Goal Priority Disciplines Outcome Goal Variances Interventions   Physical Therapy Goal     PT, PT/OT Ongoing, Progressing     Description: Goals to be met by: 2020    Patient will increase functional independence with mobility by performin. Supine to sit with supervision.   2. Sit to supine with supervision.   3. Sit<>stand transfer with supervision using rolling walker.   4. Gait > 150 feet with SBA using rolling walker.   5. Verbalize TKA precautions without verbal cues.                   History:     Past Medical History:   Diagnosis Date    Alcohol abuse     Arthritis     Asthma     Bipolar disorder     Cancer     cervical    COPD (chronic obstructive pulmonary disease)     Depression     Disc degeneration     Diverticulitis     Emphysema lung     Fatty liver     Foot fracture     left    Homelessness     Seizures     Snores     Tobacco abuse        Past Surgical History:   Procedure Laterality Date    ANTERIOR VITRECTOMY Left 2020    Procedure: VITRECTOMY, ANTERIOR APPROACH;  Surgeon: Emilee Estrada MD;  Location: [facility];  Service: Ophthalmology;  Laterality: Left;    CARPAL TUNNEL RELEASE Right 2018    Procedure: RELEASE-CARPAL TUNNEL;  Surgeon: Fransisco Jin Jr., MD;  Location: [facility];  Service: Orthopedics;  Laterality: Right;    CERVIX SURGERY      EXTRACTION OF CATARACT Left 2020    Procedure: EXTRACTION, CATARACT;  Surgeon: Emilee Estrada MD;  Location: [facility];  Service: Ophthalmology;  Laterality: Left;    EYE SURGERY Right     cataract    GASTRIC BYPASS  2017    TONSILLECTOMY      TUBAL LIGATION         Time Tracking:     PT Received On: 20  PT Start Time: 735     PT Stop Time: 0830  PT Total Time (min): 55 min     Billable Minutes: Evaluation 15, Gait Training 15, Therapeutic Activity 10 and Therapeutic Exercise 15      Varsha Edwards, PT  2020

## 2020-09-18 NOTE — PROGRESS NOTES
"MED  Progress Note    Admit Date: 9/17/2020   LOS: 0 days     SUBJECTIVE:     Follow-up For:  OA (osteoarthritis) of knee    Interval History:     Restless night with pain control.  Doing better this am.  Walked with therapy.  No CP/SOB/Calf pain.     Review of Systems:  Constitutional: No fever or chills  Respiratory: No cough or shortness of breath  Cardiovascular: No chest pain or palpitations  Gastrointestinal: No nausea or vomiting  Neurological: No confusion or weakness    OBJECTIVE:     Vital Signs Range (Last 24H):  /75 (BP Location: Right arm, Patient Position: Sitting)   Pulse 79   Temp 98.9 °F (37.2 °C) (Oral)   Resp 18   Ht 5' 2" (1.575 m)   Wt 59 kg (130 lb)   SpO2 96%   Breastfeeding No   BMI 23.78 kg/m²     Temp:  [97.2 °F (36.2 °C)-100.1 °F (37.8 °C)]   Pulse:  [64-92]   Resp:  [14-18]   BP: (102-154)/(53-82)   SpO2:  [92 %-100 %]     I & O (Last 24H):    Intake/Output Summary (Last 24 hours) at 9/18/2020 0901  Last data filed at 9/18/2020 0025  Gross per 24 hour   Intake 3100 ml   Output 3615 ml   Net -515 ml       Physical Exam:  General appearance: Well developed, well nourished  Eyes:  Conjunctivae/corneas clear. PERRL.  Lungs: Normal respiratory effort,   clear to auscultation bilaterally   Heart: Regular rate and rhythm, S1, S2 normal, no murmur, rub or henry.  Abdomen: Soft, non-tender non-distended; bowel sounds normal; no masses,  no organomegaly  Extremities: No cyanosis or clubbing. No edema.    Skin: Skin color, texture, turgor normal. No rashes or lesions  Neurologic: Normal strength and tone. No focal numbness or weakness   Left knee CDI    Laboratory Data:  Recent Labs   Lab 09/18/20  0321   WBC 6.90   RBC 3.86*   HGB 12.0   HCT 35.7*   *   MCV 93   MCH 31.1*   MCHC 33.6       BMP:   Recent Labs   Lab 09/18/20  0321   *   *   K 4.6   CL 99   CO2 23   BUN 8   CREATININE 0.6   CALCIUM 8.4*       No results found for: URICACID    BNP  No results for " input(s): BNP, BNPTRIAGEBLO in the last 168 hours.    Medications:  Medication list was reviewed and changes noted under Assessment/Plan.    Diagnostic Results:        ASSESSMENT/PLAN:     1. Left Knee (M17.10):  Per ortho/therapy teams.  2. COPD/Asthma/Emphysema (J44.9):  Cont inhalers.   3. Bipolar (F31.9):  Home meds resumed.  Cautious with over sedation.   4. Tobacco/ETOH:  .  Nicotine patch.    5. Iatragenic hyponatremia:  DC IVFs and avoid excess water.  BMP next week by PCP.    6. DVT prophy:  ASA BID.     Ok to DC from Med.

## 2020-09-18 NOTE — PT/OT/SLP EVAL
Occupational Therapy   Evaluation & Treatment    Name: Alayna Anderson  MRN: [id number]  Admitting Diagnosis:  OA (osteoarthritis) of knee 1 Day Post-Op    Recommendations:     Discharge Recommendations: home, home health PT, home health OT  Discharge Equipment Recommendations:  walker, rolling, 3-in-1 commode, shower chair  Barriers to discharge:  None    Assessment:     Alayna Anderson is a 59 y.o. female with a medical diagnosis of OA (osteoarthritis) of knee.  She presents with severe pain in L knee and fatigue. Performance deficits affecting function: impaired self care skills, impaired functional mobilty, impaired endurance, gait instability, weakness, pain, decreased lower extremity function, orthopedic precautions, decreased ROM, impaired balance.  Pt reluctant to participate in therapy upon arrival to room so encouragement and education provided.  Pt demonstrates strength and ROM in (B) UE needed for ADLs that is WNL.  After discussing purpose of OT, pt agreeable to getting dressed and ambulating to bathroom to practice toilet transfer; however, after walking snf to bathroom pt turned around and stated she needed to return to bed.  While long sitting in bed pt able to don underwear with SBA.  Pt able to perform sit <> stand transfer from EOB with CGA and RW, and take steps with SBA and RW.  Pt limited this date by severe pain.    PTA pt reports being (I) with ADLs, iADLs, and mobility.  Pt would benefit from skilled OT services to address problems listed above and maximize independence with ADLs.  Home health is recommended upon d/c from acute care to further address deficits and help pt improve overall functional independence.        Rehab Prognosis: Good; patient would benefit from acute skilled OT services to address these deficits and reach maximum level of function.       Plan:     Patient to be seen daily to address the above listed problems via self-care/home management, therapeutic  exercises, therapeutic activities  · Plan of Care Expires: 10/18/20  · Plan of Care Reviewed with: patient    Subjective     Chief Complaint: Pain in L knee  Patient/Family Comments/goals: Resume PLOF    Occupational Profile:  Living Environment: Pt lives alone in [facility].  Bathroom has tub/shower.  Previous level of function: (I) with ADLs, iADLs, and mobility.   -Has reacher available for dressing if needed (used it after eye surgery)  Roles and Routines: Sister, does not drive, denies having hobbies or preferred activities  Equipment Used at Home:  (reacher)  Assistance upon Discharge: Sisters and brother in law will be staying with pt upon d/c    Pain/Comfort:  · Pain Rating 1: 10/10  · Location - Side 1: Left  · Location 1: knee  · Pain Addressed 1: Cessation of Activity, Distraction, Nurse notified  · Pain Rating Post-Intervention 1: (15/10 L knee)    Patients cultural, spiritual, Buddhism conflicts given the current situation: no    Objective:     Communicated with: RN (Brian) and Ortho liaison RN (Charity) prior to session.  Patient found HOB elevated with cryotherapy, peripheral IV, FCD upon OT entry to room.  Rafael hose on L side around ankle; pt with previous instance of bleeding from L knee earlier in day.  BSC not present in bathroom;  notified.    General Precautions: Standard, fall   Orthopedic Precautions:LLE weight bearing as tolerated   Braces: N/A     Occupational Performance:    Bed Mobility:    · Patient completed Scooting/Bridging with contact guard assistance towards EOB; SBA while supine towards HOB  · Patient completed Supine to Sit with contact guard assistance for LLE management  · Patient completed Sit to Supine with minimum assistance for LLE managemnet    Functional Mobility/Transfers:  · Sit <> Stand: CGA and RW x 1 trial from EOB  · Functional Mobility: Pt ambulated ~20 ft in room with SBA and RW; no instances of LOB noted.    · *Cues required for RW management 2* pt  stepping to very front of walker and extending LLE past it.  · Other:  BSC not present during session.  OT discussed toilet t/f technique and demonstrated on chair; pt verbalized understanding.    Activities of Daily Living:  · Upper Body Dressing: independent for donning dress while long sitting in bed.  · Lower Body Dressing: SBA for donning underwear while long sitting in bed.  Pt able to bridge bottom to pull over.    Cognitive/Visual Perceptual:  Cognitive/Psychosocial Skills:    -       Oriented to: Person, Place, Time and Situation   -       Follows Commands/attention:Follows multistep  commands  -       Communication: clear/fluent  -       Memory: No Deficits noted  -       Safety awareness/insight to disability: intact   -       Mood/Affect/Coping skills/emotional control: Fatigued, tired, exasperated from pain, cooperative    Physical Exam:  Postural examination/scapula alignment:    -       No postural abnormalities identified  Skin integrity: Visible skin intact; one very faint trace of blood under ACE bandage on L knee  Edema:  Mild in L knee; bandage present over  Sensation: -       Intact  Motor Planning: -       WNL  Dominant hand: -       right  Upper Extremity Range of Motion: As observed through functional tasks  -       Right Upper Extremity: WNL  -       Left Upper Extremity: WNL  Upper Extremity Strength: As observed through functional tasks  -       Right Upper Extremity: WNL  -       Left Upper Extremity: WNL   Strength: 5/5 both hands  Fine Motor Coordination: -       Intact  Gross motor coordination: WFL  Balance:  Sitting- Independent; Standing-SBA-CGA  Vision: Intact    AMPAC 6 Click ADL:  AMPAC Total Score: 20    Treatment & Education:  *Pt educated on:   -orthopedic precautions  -LB dressing technique  -transfer technique from EOB, chair, and BSC  -importance of facing activity during ADLs/IADLs to prevent rotation of knee  -importance of participating in daily activities of home  and exercising knee  *Pt performed LB dressing as noted above  *Pt ambulated short distance within room to address endurance, coordination, and balance needed for functional mobility  *Orthopedic precautions and dressing technique reviewed with pt  *POC discussed    Education:    Patient left HOB elevated with all lines intact, FCD on both feet, cryotherapy on L knee, call button in reach and RN (Brian) notified.      *Pt adamantly declined sitting up in chair.  Tedhose left around L ankle as found 2* previous instance of bleeding this date.    GOALS:   Multidisciplinary Problems     Occupational Therapy Goals        Problem: Occupational Therapy Goal    Goal Priority Disciplines Outcome Interventions   Occupational Therapy Goal     OT, PT/OT Ongoing, Progressing    Description: Goals to be met by: 9/25/2020    Patient will increase functional independence with ADLs by performing:    LE Dressing with Supervision.  Grooming while standing at sink with Supervision.  Toileting from bedside commode with Supervision for hygiene and clothing management.   Toilet transfer to toilet with Supervision.                     History:     Past Medical History:   Diagnosis Date    Alcohol abuse     Arthritis     Asthma     Bipolar disorder     Cancer     cervical    COPD (chronic obstructive pulmonary disease)     Depression     Disc degeneration     Diverticulitis     Emphysema lung     Fatty liver     Foot fracture     left    Homelessness     Seizures 2013    Snores     Tobacco abuse        Past Surgical History:   Procedure Laterality Date    ANTERIOR VITRECTOMY Left 8/18/2020    Procedure: VITRECTOMY, ANTERIOR APPROACH;  Surgeon: Emilee Estrada MD;  [place] [facility] [place] [facility];  Service: Ophthalmology;  Laterality: Left;    CARPAL TUNNEL RELEASE Right 7/13/2018    Procedure: RELEASE-CARPAL TUNNEL;  Surgeon: Fransisco Jin Jr., MD;  Location: [facility], [place] [facility];  Service: Orthopedics;  Laterality: Right;    CERVIX  SURGERY      EXTRACTION OF CATARACT Left 8/18/2020    Procedure: EXTRACTION, CATARACT;  Surgeon: Emilee Estrada MD;  Location: [facility];  Service: Ophthalmology;  Laterality: Left;    EYE SURGERY Right     cataract    GASTRIC BYPASS  2017    TONSILLECTOMY      TOTAL KNEE ARTHROPLASTY Left 9/17/2020    Procedure: ARTHROPLASTY, KNEE, TOTAL;  Surgeon: Adrián Romo MD;  Location: [facility];  Service: Orthopedics;  Laterality: Left;  ADDUCTOR BLOCK    TUBAL LIGATION         Time Tracking:     OT Date of Treatment: 09/18/20  OT Start Time: 1020  OT Stop Time: 1055  OT Total Time (min): 35 min    Billable Minutes:Evaluation 27  Self Care/Home Management 8    NAM Xie  9/18/2020

## 2020-09-18 NOTE — PT/OT/SLP PROGRESS
"Physical Therapy Treatment    Patient Name:  Alayna Anderson   MRN:  [id number]    Recommendations:     Discharge Recommendations:  home, home health PT, home health OT   Discharge Equipment Recommendations: 3-in-1 commode, shower chair, walker, rolling(Pt reports she can't afford RW or shower chair)   Barriers to discharge: None    Assessment:     Alanya Anderson is a 59 y.o. female admitted with a medical diagnosis of OA (osteoarthritis) of knee.  She presents with the following impairments/functional limitations:  weakness, impaired endurance, impaired self care skills, impaired functional mobilty, gait instability, impaired balance, decreased lower extremity function, decreased safety awareness, pain, decreased ROM, edema, impaired cardiopulmonary response to activity, orthopedic precautions.    Revised PT order received. New order states "Ambulate, weight bear as tolerated, no range of motion, hold CPM." Pt tolerated treatment poorly due to pain and poor participation. Pt performed supine <> sit with min A, sit <> stand with CGA, and ambulation x 20 ft to bathroom and back with rolling walker and CGA. Pt refused to attempt additional ambulation this session. Pt very impulsive with poor safety awareness during all mobility. Pt continues to be limited by pain. Educated patient on TKA precautions, safe use of rolling walker, positioning after knee replacement (no pillow under knee), importance of mobility, and PT plan of care. Pt verbalized understanding. Pt will benefit from skilled PT services to address impairments and functional limitations.    Pt is very impulsive during ambulation and putting significant weight through UEs to unweight left leg while walking. She is unsafe to use rollator for ambulation. Highly recommend rolling walker use at home. However, patient reports she can not afford a rolling walker.    Rehab Prognosis: Good; patient would benefit from acute skilled PT services to address these " deficits and reach maximum level of function.    Recent Surgery: Procedure(s) (LRB):  ARTHROPLASTY, KNEE, TOTAL (Left) 1 Day Post-Op    Plan:     During this hospitalization, patient to be seen (QD - BID) to address the identified rehab impairments via gait training, therapeutic activities, therapeutic exercises and progress toward the following goals:    · Plan of Care Expires:  09/25/20    Subjective     Chief Complaint: L knee pain  Patient/Family Comments/goals: No goals stated  Pain/Comfort:  · Pain Rating 1: 10/10  · Location - Side 1: Left  · Location - Orientation 1: generalized  · Location 1: knee  · Pain Addressed 1: Reposition, Distraction, Cessation of Activity  · Pain Rating Post-Intervention 1: 10/10      Objective:     Communicated with RN (Brian) prior to session.  Patient found supine with cryotherapy, peripheral IV upon PT entry to room.     General Precautions: Standard, fall   Orthopedic Precautions:LLE weight bearing as tolerated(No ROM. No CPM.)   Braces: N/A     Functional Mobility: Non-slip socks and gait belt donned prior to mobility.  · Bed Mobility:     · Supine to Sit: min A for management of LLE  · Sit to Supine: min A for management of LLE  · Transfers:     · Sit to Stand: CGA with rolling walker  · Pt required verbal cues for hand placement  · Toilet transfer: CGA with rolling walker.  · Gait: 20 ft with rolling walker and CGA to and from bathroom.  · Pt required verbal cues for rolling walker management and step-through gait pattern.  · Demonstrated gait deviations of decreased stride length with step to gait pattern, decreased surgical knee flexion, decreased surgical heel strike/toe off, and increased double limb support time.  · Refused additional ambulation.    AM-PAC 6 CLICK MOBILITY  Turning over in bed (including adjusting bedclothes, sheets and blankets)?: 3  Sitting down on and standing up from a chair with arms (e.g., wheelchair, bedside commode, etc.): 3  Moving from  lying on back to sitting on the side of the bed?: 3  Moving to and from a bed to a chair (including a wheelchair)?: 3  Need to walk in hospital room?: 3  Climbing 3-5 steps with a railing?: 2  Basic Mobility Total Score: 17     Patient left supine with all lines intact and call button in reach. Cryotherapy machine re-applied.    GOALS:   Multidisciplinary Problems     Physical Therapy Goals        Problem: Physical Therapy Goal    Goal Priority Disciplines Outcome Goal Variances Interventions   Physical Therapy Goal     PT, PT/OT Ongoing, Progressing     Description: Goals to be met by: 2020    Patient will increase functional independence with mobility by performin. Supine to sit with supervision.   2. Sit to supine with supervision.   3. Sit<>stand transfer with supervision using rolling walker.   4. Gait > 150 feet with SBA using rolling walker.   5. Verbalize TKA precautions without verbal cues.                   Time Tracking:     PT Received On: 20  PT Start Time: 1330     PT Stop Time: 1354  PT Total Time (min): 24 min     Billable Minutes: Gait Training 14 and Therapeutic Activity 10    Treatment Type: Treatment  PT/PTA: PT     PTA Visit Number: 0     Varsha Edwards, PT  2020

## 2020-09-18 NOTE — PLAN OF CARE
Problem: Physical Therapy Goal  Goal: Physical Therapy Goal  Description: Goals to be met by: 2020    Patient will increase functional independence with mobility by performin. Supine to sit with supervision.   2. Sit to supine with supervision.   3. Sit<>stand transfer with supervision using rolling walker.   4. Gait > 150 feet with SBA using rolling walker.   5. Verbalize TKA precautions without verbal cues.  Outcome: Ongoing, Progressing     PT orders received. PT evaluation completed and goals/POC established. Pt tolerated evaluation well with no adverse reactions. Pt's incision actively bleeding. Notified Charity (ortho nurse navigator), who re-dressed incision. Pt performed bed mobility, transfers, and ambulation x 150 ft with CGA and rolling walker. L knee flexion/extension AROM = 15 deg (lacking full extension) to 50 deg. Pt performed HEP with verbal and tactile cues for exercises/technique. Educated patient on TKA precautions, safe use of rolling walker, home exercise program, positioning after knee replacement (no pillow under knee), importance of mobility, and PT plan of care. Pt verbalized understanding. Pt will benefit from skilled PT services to address impairments and functional limitations.

## 2020-09-18 NOTE — NURSING
Pt c/o severe pain with objective s/s of moaning and facial grimacing; previously administered hydrocodone 5 q4h prn at 2029. Notified [facility]. Connected to Dr. Braden. Order to increase administration of hydrocodone to 10mg and new order for 0.5 mg of dilaudid q2h prn for breakthrough pain. Read back for verification.    23:28 PM:  Pt stated that she does not have an allergy to dilaudid; she stated that she had an episode several years ago with other contributing factors and the medication made her nauseated. She stated she does not get swelling in her throat. Updated pt's allergies to NKDA. AJ    0637 AM:    D/C'd lyndsay at 0615. Took dressing off, per order. LLE with edema and active bleeding at site. Notified Dr. Romo. Orders to re-apply ABD pads and Ace Bandage. Verified with read back.    Pt remained free from fall and injury during the night. Lyndsay d/c'd at 0615 this AM. Dressing changed and active bleeding noted. Changed dressing, per orders. Pt tolerated diet well. PRN pain meds given, per order. Bed locked and in lowest position. Call light within reach. Will continue to monitor.

## 2020-09-18 NOTE — NURSING
Pt free of trauma, falls, and injury. Pt VSS and afebrile throughout shift. Pt free of skin breakdown. Pt dressing is clean, dry, and intact. Pt pain has been moderately controlled by PO pain meds and tolerated well. Pt has been eating and voiding adequately throughout shift. Pt has call light in reach, bed alarm on, bed brakes on, side rails up x2, bed in low position, TEDs/SCDs on, IS at bedside, and nonskid socks on. Pt lying in bed in no distress. Purposeful rounding performed this shift.

## 2020-09-18 NOTE — PLAN OF CARE
"  Problem: Physical Therapy Goal  Goal: Physical Therapy Goal  Description: Goals to be met by: 2020    Patient will increase functional independence with mobility by performin. Supine to sit with supervision.   2. Sit to supine with supervision.   3. Sit<>stand transfer with supervision using rolling walker.   4. Gait > 150 feet with SBA using rolling walker.   5. Verbalize TKA precautions without verbal cues.  2020 1359 by Varsha Edwards, PT  Outcome: Ongoing, Progressing     Revised PT order received. New order states "Ambulate, weight bear as tolerated, no range of motion, hold CPM." Pt tolerated treatment poorly due to pain and poor participation. Pt performed supine <> sit with min A, sit <> stand with CGA, and ambulation x 20 ft to bathroom and back with rolling walker and CGA. Pt refused to attempt additional ambulation this session. Pt very impulsive with poor safety awareness during all mobility. Pt continues to be limited by pain. Educated patient on TKA precautions, safe use of rolling walker, positioning after knee replacement (no pillow under knee), importance of mobility, and PT plan of care. Pt verbalized understanding. Pt will benefit from skilled PT services to address impairments and functional limitations.  "

## 2020-09-18 NOTE — PLAN OF CARE
DAVID Pinon reached out to [facility], spoke with supervisorMary Jane to check the status of assigned agency; awaiting call back on assigned agency for home health; patient okay to dc from CM standpoint.

## 2020-09-18 NOTE — PLAN OF CARE
Patient on RA with sats as documented.  Pt doing IS @ 1100  L. Will cont to encourage deep breathing and coughing. No apparent respiratory distress noted and will cont to monitor

## 2020-09-18 NOTE — PLAN OF CARE
DAVID ramsey approved, signed and faxed cost transfer to [facility] to cover patient's $ 17.13 cost for discharge

## 2020-09-18 NOTE — NURSING
Pt arrived to floor via stretcher with MINNIE Gomez and transferred to bed. AAOx4. VSS. Side rails padded. Suction at bedside.  IVF started, SCDs applied, oriented to room, call light placed within reach, bed low and locked.No complaints of pain. Sykes noted draining clear yellow urine to gravity. Small amount of red blood noted on Left knee dsg. No acute distress noted at this time. Will continue to monitor.     1830- Pt. stated it is okay to receive Morphine,Vicodin, Norco and Hydrocodone.

## 2020-09-18 NOTE — PLAN OF CARE
LMSW contacted [facility] and spoke to [facility]. Patient HH is not assigned yet. .

## 2020-09-18 NOTE — PROGRESS NOTES
Postop day 1.  Having some bleeding.  Low platelets.  History of alcohol abuse.  Hold aspirin.  Ren range of motion. Plan discharge in a.m. if look stable

## 2020-09-18 NOTE — PLAN OF CARE
Problem: Occupational Therapy Goal  Goal: Occupational Therapy Goal  Description: Goals to be met by: 9/25/2020    Patient will increase functional independence with ADLs by performing:    LE Dressing with Supervision.  Grooming while standing at sink with Supervision.  Toileting from bedside commode with Supervision for hygiene and clothing management.   Toilet transfer to toilet with Supervision.    Outcome: Ongoing, Progressing     OT evaluation complete and POC established.  Pt limited this date by severe pain in L knee.  Pt agreeable to getting dressed and ambulating to bathroom to practice toilet transfer; however, after walking half way to bathroom she turned around and stated she was unable to continue.  Pt declined further activity and was adamant about returning to bed.    PTA pt reports being (I) with ADLs, iADLs, and mobility.  Pt would benefit from skilled OT services to address deficits and maximize independence and safety with ADLs.  Home health is recommended upon d/c from acute care to further address deficits and help pt improve overall functional independence.     NAM Xie  9/18/2020        Home health is recommended upon d/c from acute care to further address deficits and help pt improve overall functional independence.

## 2020-09-18 NOTE — PLAN OF CARE
Initial Discharge Planning Assessment:     Patient admitted on 9/18/20  PCP updated in Epic: Dr. Li   Pharmacy, updated in Epic:   DME at home: none   Current dispo: Pt lives at home with alone and will discharge to his sister's house [address] [place] [address]  Transportation: Family to drive   Power of  or Living Will: none  Hospital Readmission: none     CM ordered patient a rolling walker and commode. Patient declined a shower chair  CM explained to patient N will assign her home health agency. Patient verbalized understand. CM faxed referral marked urgent to N     Case management  to follow.           09/18/20 0001   Discharge Assessment   Assessment Type Discharge Planning Assessment   Confirmed/corrected address and phone number on facesheet? Yes   Assessment information obtained from? Patient   Prior to hospitilization cognitive status: Alert/Oriented   Prior to hospitalization functional status: Assistive Equipment   Current cognitive status: Alert/Oriented   Current Functional Status: Assistive Equipment   Lives With alone   Able to Return to Prior Arrangements no   Is patient able to care for self after discharge? No   Patient's perception of discharge disposition home health   Readmission Within the Last 30 Days no previous admission in last 30 days   Patient currently being followed by outpatient case management? No   Patient currently receives any other outside agency services? No   Equipment Currently Used at Home none   Do you have any problems affording any of your prescribed medications? No   Is the patient taking medications as prescribed? yes   Does the patient have transportation home? Yes   Transportation Anticipated family or friend will provide   Does the patient receive services at the [facility]? No   Discharge Plan A Home Health   DME Needed Upon Discharge  3-in-1 commode;walker, rolling   Patient/Family in Agreement with Plan yes

## 2020-09-18 NOTE — PLAN OF CARE
CM called [facility] [phone] for an update on home health referral. CM was told home health orders were received, but home health assignment had not been finalized.

## 2020-09-19 VITALS
TEMPERATURE: 98 F | RESPIRATION RATE: 18 BRPM | WEIGHT: 130 LBS | SYSTOLIC BLOOD PRESSURE: 119 MMHG | DIASTOLIC BLOOD PRESSURE: 64 MMHG | BODY MASS INDEX: 23.92 KG/M2 | HEART RATE: 97 BPM | OXYGEN SATURATION: 94 % | HEIGHT: 62 IN

## 2020-09-19 LAB
ANION GAP SERPL CALC-SCNC: 8 MMOL/L (ref 8–16)
BASOPHILS # BLD AUTO: 0.03 K/UL (ref 0–0.2)
BASOPHILS NFR BLD: 0.5 % (ref 0–1.9)
BUN SERPL-MCNC: 10 MG/DL (ref 6–20)
CALCIUM SERPL-MCNC: 9 MG/DL (ref 8.7–10.5)
CHLORIDE SERPL-SCNC: 97 MMOL/L (ref 95–110)
CO2 SERPL-SCNC: 29 MMOL/L (ref 23–29)
CREAT SERPL-MCNC: 0.6 MG/DL (ref 0.5–1.4)
DIFFERENTIAL METHOD: ABNORMAL
EOSINOPHIL # BLD AUTO: 0.1 K/UL (ref 0–0.5)
EOSINOPHIL NFR BLD: 1.3 % (ref 0–8)
ERYTHROCYTE [DISTWIDTH] IN BLOOD BY AUTOMATED COUNT: 13.2 % (ref 11.5–14.5)
EST. GFR  (AFRICAN AMERICAN): >60 ML/MIN/1.73 M^2
EST. GFR  (NON AFRICAN AMERICAN): >60 ML/MIN/1.73 M^2
GLUCOSE SERPL-MCNC: 113 MG/DL (ref 70–110)
HCT VFR BLD AUTO: 33.9 % (ref 37–48.5)
HGB BLD-MCNC: 11.4 G/DL (ref 12–16)
IMM GRANULOCYTES # BLD AUTO: 0.02 K/UL (ref 0–0.04)
IMM GRANULOCYTES NFR BLD AUTO: 0.3 % (ref 0–0.5)
LYMPHOCYTES # BLD AUTO: 1.2 K/UL (ref 1–4.8)
LYMPHOCYTES NFR BLD: 19.7 % (ref 18–48)
MCH RBC QN AUTO: 31.1 PG (ref 27–31)
MCHC RBC AUTO-ENTMCNC: 33.6 G/DL (ref 32–36)
MCV RBC AUTO: 92 FL (ref 82–98)
MONOCYTES # BLD AUTO: 0.8 K/UL (ref 0.3–1)
MONOCYTES NFR BLD: 12.1 % (ref 4–15)
NEUTROPHILS # BLD AUTO: 4.1 K/UL (ref 1.8–7.7)
NEUTROPHILS NFR BLD: 66.1 % (ref 38–73)
NRBC BLD-RTO: 0 /100 WBC
PLATELET # BLD AUTO: 114 K/UL (ref 150–350)
PMV BLD AUTO: 11.1 FL (ref 9.2–12.9)
POTASSIUM SERPL-SCNC: 5.4 MMOL/L (ref 3.5–5.1)
RBC # BLD AUTO: 3.67 M/UL (ref 4–5.4)
SODIUM SERPL-SCNC: 134 MMOL/L (ref 136–145)
WBC # BLD AUTO: 6.2 K/UL (ref 3.9–12.7)

## 2020-09-19 PROCEDURE — 97535 SELF CARE MNGMENT TRAINING: CPT

## 2020-09-19 PROCEDURE — 36415 COLL VENOUS BLD VENIPUNCTURE: CPT

## 2020-09-19 PROCEDURE — 80048 BASIC METABOLIC PNL TOTAL CA: CPT

## 2020-09-19 PROCEDURE — 97530 THERAPEUTIC ACTIVITIES: CPT | Mod: CQ

## 2020-09-19 PROCEDURE — 25000003 PHARM REV CODE 250: Performed by: ORTHOPAEDIC SURGERY

## 2020-09-19 PROCEDURE — 85025 COMPLETE CBC W/AUTO DIFF WBC: CPT

## 2020-09-19 PROCEDURE — 97116 GAIT TRAINING THERAPY: CPT | Mod: CQ

## 2020-09-19 RX ADMIN — GABAPENTIN 300 MG: 300 CAPSULE ORAL at 10:09

## 2020-09-19 RX ADMIN — HYDROCODONE BITARTRATE AND ACETAMINOPHEN 1 TABLET: 10; 325 TABLET ORAL at 07:09

## 2020-09-19 RX ADMIN — ONDANSETRON 8 MG: 8 TABLET, ORALLY DISINTEGRATING ORAL at 07:09

## 2020-09-19 RX ADMIN — POLYETHYLENE GLYCOL 3350 17 G: 17 POWDER, FOR SOLUTION ORAL at 10:09

## 2020-09-19 RX ADMIN — HYDROCODONE BITARTRATE AND ACETAMINOPHEN 1 TABLET: 10; 325 TABLET ORAL at 11:09

## 2020-09-19 RX ADMIN — OXCARBAZEPINE 150 MG: 150 TABLET, FILM COATED ORAL at 10:09

## 2020-09-19 RX ADMIN — FAMOTIDINE 20 MG: 20 TABLET ORAL at 10:09

## 2020-09-19 RX ADMIN — MUPIROCIN 1 G: 20 OINTMENT TOPICAL at 10:09

## 2020-09-19 NOTE — DISCHARGE INSTRUCTIONS
Knee Athroscopy Discharge Instructions    1) Pain: After surgery your knee will be sore. The knee will likely have been injected with a numbing medicine (Exparel) prior to completion of surgery for pain control. This is indicated on a green bracelet that you will continue to wear for 4 days after surgery. You will   also get a prescription for pain control before you leave the hospital. Ice and elevation will assist with pain control.    a) Apply ice as much as possible for the first 72 hours. After 72 hours, apply ice for 20minutes 3-4 times a day, after therapy,after exercising or whenever experiencing pain. Avoid direct skin contact with ice to prevent frostbit.          b) Elevate the affected leg with the pillow the length of the leg  to assist with swelling and pain.  2) Incision Care:  a) Some drainage from the incision in the first 72 hours is normal. If drainage is excessive,remove bandage,  pat dry, cover with sterile gauze and secure with tape. Notify physician about excessive drainage. Staples will be removed 14 days after surgery   3) Activity:  a) Perform exercises 2-3 x day.  b) No tub or hot tub usage.DR MEDEIROS PATIENTS CANNOT SHOWER UNTIL STAPLES ARE REMOVED. Support help is mandatory during showering. If the dressing becomes wet, replace with a new dressing.   c) Wear thigh high nawaf hose stockings for 3 weeks after surgery .You may remove stockings for 1- 2 hours during the day only. Send patient home with an extra pair nawaf hose.If your physician orders the CPM machine you are to use it for 2 hours in the am and 2 hours in the pm.Increase the flexion 5 degrees each session if tolerated. This is not to replace your exercise program.  4) For lifetime after your replacement surgery, you may need antibiotic coverage before dental or minor surgical procedures.  5) Possible Complications: Call Surgeon  a) Infection: Report these signs and symptoms to your surgeon.  i) Unexpected redness around  incision   ii) Persistent drainage from wound after 72 hours.  iii) Temperature ,can be treated with Tylenol. Do not go to the emergency room or urgent care center, call your surgeon.   iv) Additional swelling  v) Pain not controlled with current pain medication  b) Blood Clot: Report theses signs and symptoms to your surgeon  i) Unusual pain  ii) Red or discolored skin  iii) Swelling in the leg  iv) Unusual warm skin

## 2020-09-19 NOTE — PLAN OF CARE
7:58 AM  No CPM per MD, monitor dressings    LLE NV intact, Dressing remains CDI thru [facility]    11:17 AM  In agreement and eager for DC.  Post operative limb neurovascularly intact.  DME arranged per CMGT.  Walker has arrived to room and CPM and polar care packed for pt.  Provided TEDS and gauze for home use    VU of DC instructions, paperwork and prescriptions delivered. IV removed with cath tip intact, WNL.   To be DC home with sister.  Will be escorted downstairs via  transport team once dressed and ready.  Free from falls or skin breakdown this hospital admission.

## 2020-09-19 NOTE — PROGRESS NOTES
Alpesh  Planning    HOME  HEALTH AND DME      RN DAVID CALLED   PHN REP  For name home health - pending call back - Requested   Phn to   call pt with name [facility]      Dme: needs : last and bsc  @ bs      pt stated she will need to pay in  3  installments for  r walker - was not covered has rollator - info gvien to [facility] dme        update : 2pm   RN DAVID  WAS CALLED SUPERVISOR  Name [facility] : [place]    shyla benites called pt and notified her     Sandi Alas RN  Case management 9/19/20201:45 PM  # [phone] (FAX) [phone]

## 2020-09-19 NOTE — NURSING
Patient remained free from fall and injury throughout the day. Patient is awake, alert, and oriented x 4.Patient complained of pain during the night; PRN medications administered per orders. VSS through the shift. SCDs in place. Rest and fluids promoted.Education provided on keeping LLE straight due to bleeding episode. Pt ambulated to bathroom with assistance using walker. WBAT. Bed locked in lowest position with side rails up x 2. Call light is within reach of patient. Purposeful rounding maintained throughout shift. Will continue to monitor.

## 2020-09-19 NOTE — PLAN OF CARE
Problem: Physical Therapy Goal  Goal: Physical Therapy Goal  Description: Goals to be met by: 2020    Patient will increase functional independence with mobility by performin. Supine to sit with supervision.   2. Sit to supine with supervision.   3. Sit<>stand transfer with supervision using rolling walker. MET 2020   4. Gait > 150 feet with SBA using rolling walker.   5. Verbalize TKA precautions without verbal cues.  2020 0944 by Rosa Dumont PTA  Outcome: Ongoing, Progressing     Pt seated in bedside chair upon arrival. Pt able to recall 1/3 posterior hip precautions.  Sit<>stand with RW supervision. Pt ambulated 125ft in br[place] with RW & CGA/Yo at times. Pt often picking up RW & required cues for rolling & reciprocal pattern. Toward end of gait trial pt reports fatigue & requesting to sit, required cues to slow gait to avoid instability to reach chair. Stand to sit SBA with cues for controlled descend.  Pt performed LAQ, quad sets & AP x12 B LE in bedside chair. Pt declined practicing threshold step due to having ramp to enter home. Pt reports she does not feel she needs second session this PM due to this being her 3rd hip surgery.

## 2020-09-19 NOTE — PLAN OF CARE
Problem: Occupational Therapy Goal  Goal: Occupational Therapy Goal  Description: Goals to be met by: 9/25/2020    Patient will increase functional independence with ADLs by performing:    LE Dressing with Supervision.NOT MET  Grooming while standing at sink with Supervision. NOT MET  Toileting from bedside commode with Supervision for hygiene and clothing management. (MET for toilet use 9/20/20)  Toilet transfer to toilet with Supervision. NOT MET    Outcome: Met   Knee pain continues to  limit function.  She was SBA sit><stand, SBA toilet transfer, and SBA for Rollator use for ambulation.  She completed G/H at sink with SBA, LBD Mod A, and toilet hygiene SBA with home activity precautions and recommendations provided.  Mirna Grullon, ScD, LOTR 9/19/2020

## 2020-09-19 NOTE — PT/OT/SLP PROGRESS
"Physical Therapy Treatment    Patient Name:  Alayna Anderson   MRN:  [id number]    Recommendations:     Discharge Recommendations:  home, home health PT, home health OT   Discharge Equipment Recommendations: 3-in-1 commode, shower chair, walker, rolling(Pt reports she can't afford RW or shower chair)   Barriers to discharge: Decreased caregiver support (pt states she lives with sister who is also ill, but that brother in law can assist some)    Assessment:     Alayna Anderson is a 59 y.o. female admitted with a medical diagnosis of OA (osteoarthritis) of knee.  She presents with the following impairments/functional limitations:  impaired functional mobilty, decreased safety awareness, gait instability, impaired joint extensibility, orthopedic precautions, decreased ROM, decreased lower extremity function, impaired self care skills, impaired balance, other (comment)(refusal to use RW for safety) .    Supine to sit Yo at L LE per pt request. Sit<>stand with RW CGA for steadying/balance. Pt ambulated 25ft x2 to and from rest room, cues required for RW management (stays too close to front). Toilet transfer CGA with cues for keeping L LE extended. Pt refusing to ambulate further- also states she will not buy nor use RW at home and wants to use rollator. Requested we try gait with rollator for safety assessment- pt states "you can come back between breakfast & lunch and I'll try." Pt with decreased safety awareness & at risk for falls due to being impulsive with gait & RW.   Will attempt rollator gait trial this PM.    Rehab Prognosis: Good; patient would benefit from acute skilled PT services to address these deficits and reach maximum level of function.    Recent Surgery: Procedure(s) (LRB):  ARTHROPLASTY, KNEE, TOTAL (Left) 2 Days Post-Op    Plan:     During this hospitalization, patient to be seen (QD - BID) to address the identified rehab impairments via gait training, therapeutic activities, therapeutic " "exercises and progress toward the following goals:    · Plan of Care Expires:  09/25/20    Subjective     Chief Complaint: pt c/o having to use bedpan over night & wetting clothes.   Patient/Family Comments/goals: pt states "I will not be pushed today, I'll do as much as I want." Pt also states "I'm not buying one of those rolling walkers, I'm going to use my rollator and my sister has furniture and all close together I can use to get around"  Pain/Comfort:  · Pain Rating 1: 9/10(with movement only)  · Location - Side 1: Left  · Location - Orientation 1: generalized  · Location 1: knee  · Pain Addressed 1: Pre-medicate for activity, Cessation of Activity, Distraction, Reposition  · Pain Rating Post-Intervention 1: 0/10(resting in bedside chair with LEs positioned to comfort)      Objective:     Communicated with Nurse Orellana prior to session.  Patient found supine with cryotherapy, peripheral IV upon PT entry to room.     General Precautions: Standard, fall   Orthopedic Precautions:(no ROM on L LE)   Braces:       Functional Mobility:  Bed mobility- Supine to sit Yo at L LE per pt request.   Transfers-Sit<>stand with RW CGA for steadying/balance.   Gait- Pt ambulated 25ft x2 to and from rest room, cues required for RW management (stays too close to front). Step to gait, decreased WB on L LE.    AM-PAC 6 CLICK MOBILITY  Turning over in bed (including adjusting bedclothes, sheets and blankets)?: 4  Sitting down on and standing up from a chair with arms (e.g., wheelchair, bedside commode, etc.): 3  Moving from lying on back to sitting on the side of the bed?: 3  Moving to and from a bed to a chair (including a wheelchair)?: 3  Need to walk in hospital room?: 3  Climbing 3-5 steps with a railing?: 2  Basic Mobility Total Score: 18       Therapeutic Activities and Exercises:  Toilet transfer CGA for safety/stability with RW & cues for keeping L LE extended during sit<>stand.    Patient left up in chair with all lines " intact, call button in reach, chair alarm on, nurse Reyna notified and L LE positioned to comfort. Pt refusing cryotherapy to L knee despite education on importance of ice (states it hurts her knee)..    GOALS:   Multidisciplinary Problems     Physical Therapy Goals        Problem: Physical Therapy Goal    Goal Priority Disciplines Outcome Goal Variances Interventions   Physical Therapy Goal     PT, PT/OT Ongoing, Progressing     Description: Goals to be met by: 2020    Patient will increase functional independence with mobility by performin. Supine to sit with supervision.   2. Sit to supine with supervision.   3. Sit<>stand transfer with supervision using rolling walker.   4. Gait > 150 feet with SBA using rolling walker.   5. Verbalize TKA precautions without verbal cues.                   Time Tracking:     PT Received On: 20  PT Start Time: 0800     PT Stop Time: 0830  PT Total Time (min): 30 min     Billable Minutes: Gait Training 15 and Therapeutic Activity 15    Treatment Type: Treatment  PT/PTA: PTA     PTA Visit Number: 1     Rosa Dumont PTA  2020

## 2020-09-19 NOTE — DISCHARGE SUMMARY
[facility]  Discharge Summary      Admit Date: 9/17/2020    Discharge Date and Time: No discharge date for patient encounter.    Attending Physician: Adrián Romo MD     Reason for Admission:  Osteoarthritis left knee    Procedures Performed: Procedure(s) (LRB):  ARTHROPLASTY, KNEE, TOTAL (Left)    Hospital Course (synopsis of major diagnoses, care, treatment, and services provided during the course of the hospital stay):  Her course was a little longer because she had some bleeding hematoma from the left knee.  Platelet count is low.  History of severe alcoholism in the past.  Stop the aspirin.  Knee look better on the 2nd day.  Scant drainage.  Okay to be discharged home with home health.  Pain medication on occasion.  Range of motion Rafael hose.  No other chemoprophylaxis because of her low platelets and propensity to bleed.  She will follow up next week unless there is a problem.      Consults: nephrology    Significant Diagnostic Studies: Labs: All labs within the past 24 hours have been reviewed    Final Diagnoses:    Principal Problem: OA (osteoarthritis) of knee   Secondary Diagnoses:   Active Hospital Problems   No active problems to display.      Resolved Hospital Problems    Diagnosis Date Resolved POA    *OA (osteoarthritis) of knee [M17.10] 09/18/2020 Yes    OA (osteoarthritis) of knee [M17.10] 09/17/2020 Yes       Discharged Condition: good    Disposition: Home-Health Care [facility] [place]    Follow Up/Patient Instructions:     Medications:  Reconciled Home Medications:      Medication List      START taking these medications    HYDROcodone-acetaminophen  mg per tablet  Commonly known as: NORCO  Take 1 tablet by mouth every 6 (six) hours as needed.        CONTINUE taking these medications    cyanocobalamin 1000 MCG tablet  Commonly known as: VITAMIN B-12  Take 100 mcg by mouth once daily.     * fluticasone propionate 110 mcg/actuation inhaler  Commonly known as: FLOVENT HFA  Inhale 1  "puff into the lungs 2 (two) times daily. Controller     * fluticasone propionate 50 mcg/actuation nasal spray  Commonly known as: FLONASE  2 sprays by Each Nare route once daily.     gabapentin 300 MG capsule  Commonly known as: NEURONTIN  3 po tid.     ibuprofen 800 MG tablet  Commonly known as: ADVIL,MOTRIN  Take 1 tablet (800 mg total) by mouth every meal as needed for Pain.     mirtazapine 15 MG tablet  Commonly known as: REMERON  Take 15 mg by mouth every evening.     OXcarbazepine 300 MG Tab  Commonly known as: TRILEPTAL  Take 150 mg by mouth 2 (two) times daily.     pantoprazole 40 MG tablet  Commonly known as: PROTONIX  Take 40 mg by mouth once daily.     QUEtiapine 50 MG tablet  Commonly known as: SEROQUEL  Take 50 mg by mouth every evening.     VENTOLIN HFA 90 mcg/actuation inhaler  Generic drug: albuterol  Inhale 2 puffs into the lungs every 6 (six) hours as needed for Wheezing. Rescue         * This list has 2 medication(s) that are the same as other medications prescribed for you. Read the directions carefully, and ask your doctor or other care provider to review them with you.              Discharge Procedure Orders   WALKER FOR HOME USE     Order Specific Question Answer Comments   Type of Walker: Adult (5'4"-6'6")    With wheels? Yes    Height: 5' 2" (1.575 m)    Weight: 59 kg (130 lb)    Length of need (1-99 months): 99    Does patient have medical equipment at home? none    Please check all that apply: Patient's condition impairs ambulation.    Please check all that apply: Walker will be used for gait training.    Please check all that apply: Patient is unable to safely ambulate without equipment.      COMMODE FOR HOME USE     Order Specific Question Answer Comments   Type: Standard    Height: 5' 2" (1.575 m)    Weight: 59 kg (130 lb)    Does patient have medical equipment at home? none    Length of need (1-99 months): 99      Follow-up Information     Please follow up.    Why: AS SCHEDULED , Call " [phone] to reach Dr. Romo

## 2020-09-19 NOTE — PLAN OF CARE
Received call back from Mary Jane with PHN, patient assigned to [facility]. Updated DAVID Rose

## 2020-09-19 NOTE — PT/OT/SLP PROGRESS
Occupational Therapy   Treatment/Discharge    Name: Alayna Anderson  MRN: [id number]  Admitting Diagnosis:  OA (osteoarthritis) of knee  2 Days Post-Op    left knee replacement [employer] petersonguard 60/67.5/10/31  Recommendations:     Discharge Recommendations: home health OT, home health PT  Discharge Equipment Recommendations:  3-in-1 commode, walker, rolling  Barriers to discharge:  None    Assessment:     Alayna Anderson is a 59 y.o. female with a medical diagnosis of OA (osteoarthritis) of knee.  She presents with knee pain limiting function.. Performance deficits affecting function are weakness, impaired endurance, impaired self care skills, impaired functional mobilty, gait instability, impaired balance, decreased lower extremity function, decreased safety awareness, pain, decreased ROM, impaired skin, edema, orthopedic precautions.   She was SBA sit><stand, SBA toilet transfer, and SBA for Rollator use for ambulation.  She completed G/H at sink with SBA, LBD Mod A, and toilet hygiene SBA with home activity precautions and recommendations provided.  No further OT treatment planned, hospital discharge scheduled for today.    Rehab Prognosis:  Good; patient would benefit from acute skilled OT services to address these deficits and reach maximum level of function.       Plan:     Patient to be seen daily to address the above listed problems via self-care/home management, therapeutic activities, therapeutic exercises  · Plan of Care Expires: 09/18/20  · Plan of Care Reviewed with: patient    Subjective     Pain/Comfort:  · Pain Rating 1: 8/10  · Location - Side 1: Left  · Location - Orientation 1: generalized  · Location 1: knee  · Pain Addressed 1: Pre-medicate for activity, Reposition, Cessation of Activity  · Pain Rating Post-Intervention 1: 9/10    Objective:     Communicated with: patient and her nurse prior to session.  Patient found supine with peripheral IV upon OT entry to room. She was seen on the second  attempt, agreeable to OT treatment.    General Precautions: Standard, fall   Orthopedic Precautions:LLE weight bearing as tolerated   Braces:       Occupational Performance:     Bed Mobility:    · Min A supine>sit EOB  · Min A sit>supine     Functional Mobility/Transfers:  · SBA sit><stand with rollator  · SBA toilet transfer with rollator  ·   · Functional Mobility: ambulated bed><bathroom with rollator SBA, fair safety, difficulty ambulating with rollator with need to lean on rollator handles to support LE balance    Activities of Daily Living:  · SBA G/H (wash hands) standing at sink with rollator  · Declined UBD  · Mod A LBD (don briefs and slacks, with doff/don socks and shoes demonstrated) sitting and standing with rollator at EOB  · SBA toilet hygiene at toilet    [facility] 6 Click ADL: 19    Treatment & Education:  Rollator safety with transfers and standing ADLs, pain management post-TKA surgery, integrating LE exercise into ADLs,  Home activity precautions and recommendations, LBD with and without assist, shoe choice recommendations.    Patient left supine with call button in reach, bed alarm off and nurse notifiedEducation:      GOALS:   Multidisciplinary Problems     Occupational Therapy Goals     Not on file          Multidisciplinary Problems (Resolved)        Problem: Occupational Therapy Goal    Goal Priority Disciplines Outcome Interventions   Occupational Therapy Goal   (Resolved)     OT, PT/OT Met    Description: Goals to be met by: 9/25/2020    Patient will increase functional independence with ADLs by performing:    LE Dressing with Supervision.NOT MET  Grooming while standing at sink with Supervision. NOT MET  Toileting from bedside commode with Supervision for hygiene and clothing management. (MET for toilet use 9/20/20)  Toilet transfer to toilet with Supervision. NOT MET                     Time Tracking:     OT Date of Treatment: 09/19/20  OT Start Time: 0942  OT Stop Time: 1032  OT Total Time  (min): 50 min    Billable Minutes:Self Care/Home Management 50    Mirna Grullon, ScD, LOTR  9/19/2020

## 2020-09-19 NOTE — PLAN OF CARE
"  Problem: Physical Therapy Goal  Goal: Physical Therapy Goal  Description: Goals to be met by: 2020    Patient will increase functional independence with mobility by performin. Supine to sit with supervision.   2. Sit to supine with supervision.   3. Sit<>stand transfer with supervision using rolling walker.   4. Gait > 150 feet with SBA using rolling walker.   5. Verbalize TKA precautions without verbal cues.  Outcome: Ongoing, Progressing   Supine to sit Yo at L LE per pt request. Sit<>stand with RW CGA for steadying/balance. Pt ambulated 25ft x2 to and from rest room, cues required for RW management (stays too close to front). Toilet transfer CGA with cues for keeping L LE extended. Pt refusing to ambulate further- also states she will not buy nor use RW at home and wants to use rollator. Requested we try gait with rollator for safety assessment- pt states "you can come back between breakfast & lunch and I'll try." Pt with decreased safety awareness & at risk for falls due to being impulsive with gait & RW.   Will attempt rollator gait trial this PM.  "

## 2020-09-25 ENCOUNTER — EXTERNAL HOME HEALTH (OUTPATIENT)
Dept: HOME HEALTH SERVICES | Facility: HOSPITAL | Age: 60
End: 2020-09-25

## 2020-10-08 ENCOUNTER — DOCUMENT SCAN (OUTPATIENT)
Dept: HOME HEALTH SERVICES | Facility: HOSPITAL | Age: 60
End: 2020-10-08

## 2020-10-16 ENCOUNTER — TELEPHONE (OUTPATIENT)
Dept: PAIN MEDICINE | Facility: CLINIC | Age: 60
End: 2020-10-16

## 2020-10-16 NOTE — TELEPHONE ENCOUNTER
Spoke with pt, I notified her that she will have to either get it refilled by her pcp or I can move her appt up since she's been here before. Appt rescheduled for 10/21 with Elbert.    Pt seen [facility] last year

## 2020-10-16 NOTE — TELEPHONE ENCOUNTER
----- Message from Cathy Smith sent at 10/16/2020  2:54 PM CDT -----  Name of Who is Calling: DEE PORTILLO  What is the request in detail: The patient is calling to speak to the nurse in regards to finding out if the doctor will refill gabapentin (NEURONTIN) 300 MG capsule for the patient until her appointment 12/29/2020. The patient is coming in as a new patient. Please advise Can the clinic reply by [facility]: No What Number to Call Back if not in [facility]: [phone]

## 2020-10-21 ENCOUNTER — OFFICE VISIT (OUTPATIENT)
Dept: SPINE | Facility: CLINIC | Age: 60
End: 2020-10-21
Payer: MEDICARE

## 2020-10-21 VITALS
HEART RATE: 77 BPM | DIASTOLIC BLOOD PRESSURE: 67 MMHG | BODY MASS INDEX: 23.13 KG/M2 | WEIGHT: 125.69 LBS | HEIGHT: 62 IN | SYSTOLIC BLOOD PRESSURE: 144 MMHG

## 2020-10-21 DIAGNOSIS — M47.812 CERVICAL SPONDYLOSIS WITHOUT MYELOPATHY: ICD-10-CM

## 2020-10-21 DIAGNOSIS — M54.2 NECK PAIN: ICD-10-CM

## 2020-10-21 DIAGNOSIS — M51.37 DDD (DEGENERATIVE DISC DISEASE), LUMBOSACRAL: ICD-10-CM

## 2020-10-21 DIAGNOSIS — M50.30 DEGENERATION OF CERVICAL INTERVERTEBRAL DISC: ICD-10-CM

## 2020-10-21 DIAGNOSIS — M48.02 CERVICAL STENOSIS OF SPINAL CANAL: ICD-10-CM

## 2020-10-21 DIAGNOSIS — M54.41 CHRONIC BILATERAL LOW BACK PAIN WITH BILATERAL SCIATICA: ICD-10-CM

## 2020-10-21 DIAGNOSIS — M43.10 ACQUIRED SPONDYLOLISTHESIS: ICD-10-CM

## 2020-10-21 DIAGNOSIS — G89.29 CHRONIC BILATERAL LOW BACK PAIN WITH BILATERAL SCIATICA: ICD-10-CM

## 2020-10-21 DIAGNOSIS — M54.42 CHRONIC BILATERAL LOW BACK PAIN WITH BILATERAL SCIATICA: ICD-10-CM

## 2020-10-21 DIAGNOSIS — M54.17 THORACIC AND LUMBOSACRAL NEURITIS: ICD-10-CM

## 2020-10-21 DIAGNOSIS — M54.14 THORACIC AND LUMBOSACRAL NEURITIS: ICD-10-CM

## 2020-10-21 PROCEDURE — 3008F BODY MASS INDEX DOCD: CPT | Mod: CPTII,S$GLB,, | Performed by: NURSE PRACTITIONER

## 2020-10-21 PROCEDURE — 99213 PR OFFICE/OUTPT VISIT, EST, LEVL III, 20-29 MIN: ICD-10-PCS | Mod: S$GLB,,, | Performed by: NURSE PRACTITIONER

## 2020-10-21 PROCEDURE — 99999 PR PBB SHADOW E&M-EST. PATIENT-LVL III: ICD-10-PCS | Mod: PBBFAC,,, | Performed by: NURSE PRACTITIONER

## 2020-10-21 PROCEDURE — 3008F PR BODY MASS INDEX (BMI) DOCUMENTED: ICD-10-PCS | Mod: CPTII,S$GLB,, | Performed by: NURSE PRACTITIONER

## 2020-10-21 PROCEDURE — 99999 PR PBB SHADOW E&M-EST. PATIENT-LVL III: CPT | Mod: PBBFAC,,, | Performed by: NURSE PRACTITIONER

## 2020-10-21 PROCEDURE — 99213 OFFICE O/P EST LOW 20 MIN: CPT | Mod: S$GLB,,, | Performed by: NURSE PRACTITIONER

## 2020-10-21 RX ORDER — IBUPROFEN 800 MG/1
800 TABLET ORAL
Qty: 270 TABLET | Refills: 1 | Status: SHIPPED | OUTPATIENT
Start: 2020-10-21 | End: 2021-01-19

## 2020-10-21 RX ORDER — CYCLOBENZAPRINE HCL 10 MG
10 TABLET ORAL
COMMUNITY
End: 2020-10-21 | Stop reason: SDUPTHER

## 2020-10-21 RX ORDER — GABAPENTIN 300 MG/1
900 CAPSULE ORAL 3 TIMES DAILY
Qty: 810 CAPSULE | Refills: 1 | Status: SHIPPED | OUTPATIENT
Start: 2020-10-21 | End: 2021-03-30 | Stop reason: SDUPTHER

## 2020-10-21 RX ORDER — CYCLOBENZAPRINE HCL 10 MG
10 TABLET ORAL 2 TIMES DAILY PRN
Qty: 180 TABLET | Refills: 1 | Status: SHIPPED | OUTPATIENT
Start: 2020-10-21 | End: 2021-01-19

## 2020-10-21 NOTE — PROGRESS NOTES
Subjective:      Patient ID: Alayna Anderson is a 59 y.o. female.    Chief Complaint: Neck Pain      Interval History 10/21/2020:   The patient presents for delayed follow up and medication refill. She is recently just over 4-5 weeks post L TKA. She states doing well, chronic pain is cervical and lumbar. She is using rolling walker for ambulation. She is currently taking 900mg TID, Flexeril PRN and Motrin 800mg and needs refills of these. The patient denies myelopathic symptoms such as handwriting changes or difficulty with buttons/coins/keys. Denies perineal paresthesias, bowel/bladder dysfunction.      Neck Pain   Pertinent negatives include no fever.       Known mild DDD/spondylosis C4-T1 with slip C7-T1 along with multilevel foraminal stenosis and mild central stenosis C5-C6/C7-T1. Recent EMG showed right CTS with no cervical radiculopathy. Also with known retrolisthesis L3-L4, mild diffuse DDD/spondylosis. History of ETOH abuse and cocaine use, bipolar, COPD, and seizure disorder. Was in ED with acute ETOH intoxication in February.     Sent to [facility] at last visit and started back on flexeril, motrin, and neurontin.     LBP > neck pain.     She did not make it to [facility]. She had a fall a few weeks ago into a concrete wall. She fractured her right wrist and her nose. She complains of constant neck pain with no arm pain. Pain is worse with activity and better with medications. No numbness, tingling, or weakness. She rates her pain as a 9 on a scale of 1-10.     Also with constant LBP with bilateral leg pain (entire leg). LBP = leg pain. Right leg pain = left leg pain. Pain is worse at night. Some improvement with her TENs unit and medications. She rates her pain as an 9 on a scale of 1-10. Pain is sharp in nature. She is taking neurontin 900mg tid, flexeril prn, and motrin prn. Needs refills on neurontin and flexeril.     No drinking since February. No drug use.        Review of Systems    Constitution: Negative for chills, fever, night sweats and weight gain.   Musculoskeletal: Positive for neck pain.   Gastrointestinal: Negative for bowel incontinence, nausea and vomiting.   Genitourinary: Negative for bladder incontinence.   Neurological: Negative for disturbances in coordination and loss of balance.           Objective:      XR CERVICAL SPINE AP LAT WITH FLEX EXTEN     CLINICAL HISTORY:  Cervicalgia     TECHNIQUE:  Three views of the cervical spine plus flexion and extension views were performed.     COMPARISON:  09/21/2017     FINDINGS:  Grade 1 anterolisthesis of C7 on T1 is again noted.  This is slightly progressed when compared to 2017 exam.  This measures approximately 6 mm (previously 4 mm) there does not appear to be significant translation in flexion/extension view, although the flexion view is somewhat limited by patient positioning.  No acute fracture.  Degenerative changes of the cervical spine elsewhere are similar in comparison to prior exam.  Lung apices are clear.  Prevertebral soft tissues are within normal limits.     Impression:     Anterolisthesis of C7 on T1 which is slightly increased from 2017 exam.  No evidence of translation of listhesis and dynamic imaging     XR LUMBAR SPINE AP AND LAT WITH FLEX/EXT     CLINICAL HISTORY:  Spondylolisthesis, site unspecified     TECHNIQUE:  AP and lateral views as well as lateral flexion and extension images are performed through the lumbar spine.     COMPARISON:  09/21/2017     FINDINGS:  Five non-rib-bearing lumbar type vertebral bodies.  There is a minimal levoscoliosis centered at approximately L3.  There is grade 1 retrolisthesis of L3 on L4.  No significant translation in flexion/extension view.  No acute fracture.  Degenerative changes are noted throughout the lumbar spine.     Impression:     Degenerative changes of the lumbar spine and trace retrolisthesis L3 on L4 without instability.    General: Alayna is well-developed,  well-nourished, appears stated age, in no acute distress, alert and oriented to time, place and person.     General    Nursing note and vitals reviewed.  Constitutional: She is oriented to person, place, and time. She appears well-developed and well-nourished.   Cardiovascular: Normal rate.    Pulmonary/Chest: Effort normal.   Neurological: She is alert and oriented to person, place, and time.   Psychiatric: She has a normal mood and affect. Her behavior is normal.         Back (L-Spine & T-Spine) / Neck (C-Spine) Exam     Comments:  Gait: Using rolling walker.   Good ROM with extension and rotation.     L knee with healing midline surgical scar.          Patient sits comfortably in the exam room and answers questions appropriately. Grossly patient is able to move bilateral UEs/LEs without difficulty. Normal gait.     Mild posterior cervical tenderness. Reasonable ROM of cervical spine.     Strength Testing of Bilateral UEs shows  Right :  +5/5   Left :  +5/5  Right deltoid:  +5/5   Left deltoid:  +5/5  Right biceps:  +5/5   Left biceps:  +5/5  Right triceps:  +5/5   Left triceps:  +5/5  Right wrist extension:  Not tested Left wrist extension:  +5/5  Right wrist flexion:  Not tested Left wrist flexion:  +5/5  Right interosseus:  +5/5  Left interosseus:  +5/5    She has right velcro wrist brace on.     She has diffuse lower lumbar tenderness.     Strength testing of the bilateral LEs shows  Right hip abduction:  +5/5  Left hip abduction:  +5/5  Right hip flexion:  +5/5   Left hip flexion:  +5/5  Right hip extensors:  +5/5  Left hip extensors:  +5/5  Right quadriceps:  +5/5  Left quadriceps:  +5/5  Right hamstring:  +5/5  Left hamstring:  +5/5  Right dorsiflexion:   +5/5  Left dorsiflexion:  +5/5  Right plantar flexion:  +5/5  Left plantar flexion:  +5/5   Right EHL:  +5/5   Left EHL:  +5/5    Sensation is grossly intact to light touch in bilateral LEs.     Negative SLR bilaterally.         Assessment:        1. DDD (degenerative disc disease), lumbosacral    2. Thoracic and lumbosacral neuritis    3. Acquired spondylolisthesis    4. Chronic bilateral low back pain with bilateral sciatica    5. Neck pain    6. Cervical spondylosis without myelopathy    7. Degeneration of cervical intervertebral disc    8. Cervical stenosis of spinal canal           Plan:            DDD/spondylosis. History of ETOH abuse and cocaine use, bipolar, COPD, and seizure disorder. Treatment options reviewed with patient and following plan made:     - Prior records reviewed  - Pt stable with med mgt and needing refills today   - Refilled neurontin 900mg tid, flexeril prn, and motrin prn with food.   - Continue with prn TENs unit.   - I have stressed the importance of physical activity and a home exercise plan to help with pain and improve health.  - Patient can continue with medications for now since they are providing benefits, using them appropriately, and without side effects.  - Counseled patient regarding the importance of activity modification.  - RTC 3 months or sooner if needed    Follow-up: No follow-ups on file. If there are any questions prior to this, the patient was instructed to contact the office.

## 2021-03-24 ENCOUNTER — TELEPHONE (OUTPATIENT)
Dept: SMOKING CESSATION | Facility: CLINIC | Age: 61
End: 2021-03-24

## 2021-03-30 DIAGNOSIS — M48.02 CERVICAL STENOSIS OF SPINAL CANAL: ICD-10-CM

## 2021-03-30 DIAGNOSIS — M47.812 CERVICAL SPONDYLOSIS WITHOUT MYELOPATHY: ICD-10-CM

## 2021-03-30 DIAGNOSIS — M54.42 CHRONIC BILATERAL LOW BACK PAIN WITH BILATERAL SCIATICA: ICD-10-CM

## 2021-03-30 DIAGNOSIS — M51.37 DDD (DEGENERATIVE DISC DISEASE), LUMBOSACRAL: ICD-10-CM

## 2021-03-30 DIAGNOSIS — G89.4 CHRONIC PAIN DISORDER: Primary | ICD-10-CM

## 2021-03-30 DIAGNOSIS — M43.10 ACQUIRED SPONDYLOLISTHESIS: ICD-10-CM

## 2021-03-30 DIAGNOSIS — M54.41 CHRONIC BILATERAL LOW BACK PAIN WITH BILATERAL SCIATICA: ICD-10-CM

## 2021-03-30 DIAGNOSIS — M54.17 THORACIC AND LUMBOSACRAL NEURITIS: ICD-10-CM

## 2021-03-30 DIAGNOSIS — M54.2 NECK PAIN: ICD-10-CM

## 2021-03-30 DIAGNOSIS — M54.14 THORACIC AND LUMBOSACRAL NEURITIS: ICD-10-CM

## 2021-03-30 DIAGNOSIS — G89.29 CHRONIC BILATERAL LOW BACK PAIN WITH BILATERAL SCIATICA: ICD-10-CM

## 2021-03-30 DIAGNOSIS — M50.30 DEGENERATION OF CERVICAL INTERVERTEBRAL DISC: ICD-10-CM

## 2021-03-30 RX ORDER — GABAPENTIN 300 MG/1
900 CAPSULE ORAL 3 TIMES DAILY
Qty: 810 CAPSULE | Refills: 1 | Status: SHIPPED | OUTPATIENT
Start: 2021-03-30 | End: 2021-04-30

## 2021-03-30 RX ORDER — CYCLOBENZAPRINE HCL 10 MG
10 TABLET ORAL 2 TIMES DAILY PRN
Qty: 180 TABLET | Refills: 1 | Status: SHIPPED | OUTPATIENT
Start: 2021-03-30 | End: 2021-04-09

## 2021-03-30 RX ORDER — IBUPROFEN 800 MG/1
800 TABLET ORAL
Qty: 270 TABLET | Refills: 1 | Status: SHIPPED | OUTPATIENT
Start: 2021-03-30 | End: 2021-04-12

## 2021-04-07 ENCOUNTER — CLINICAL SUPPORT (OUTPATIENT)
Dept: SMOKING CESSATION | Facility: CLINIC | Age: 61
End: 2021-04-07
Payer: COMMERCIAL

## 2021-04-07 DIAGNOSIS — F17.200 NICOTINE DEPENDENCE: Primary | ICD-10-CM

## 2021-04-07 PROCEDURE — 99407 BEHAV CHNG SMOKING > 10 MIN: CPT | Mod: S$GLB,,,

## 2021-04-07 PROCEDURE — 99407 PR TOBACCO USE CESSATION INTENSIVE >10 MINUTES: ICD-10-PCS | Mod: S$GLB,,,

## 2021-04-16 ENCOUNTER — PATIENT MESSAGE (OUTPATIENT)
Dept: RESEARCH | Facility: HOSPITAL | Age: 61
End: 2021-04-16

## 2021-05-07 ENCOUNTER — OFFICE VISIT (OUTPATIENT)
Dept: ORTHOPEDICS | Facility: CLINIC | Age: 61
End: 2021-05-07
Payer: MEDICARE

## 2021-05-07 ENCOUNTER — HOSPITAL ENCOUNTER (OUTPATIENT)
Dept: RADIOLOGY | Facility: OTHER | Age: 61
Discharge: HOME | End: 2021-05-07
Attending: PLASTIC SURGERY
Payer: MEDICARE

## 2021-05-07 VITALS
DIASTOLIC BLOOD PRESSURE: 91 MMHG | HEART RATE: 82 BPM | BODY MASS INDEX: 22.86 KG/M2 | SYSTOLIC BLOOD PRESSURE: 156 MMHG | WEIGHT: 125 LBS

## 2021-05-07 DIAGNOSIS — R20.0 NUMBNESS AND TINGLING IN RIGHT HAND: ICD-10-CM

## 2021-05-07 DIAGNOSIS — R20.2 NUMBNESS AND TINGLING IN RIGHT HAND: ICD-10-CM

## 2021-05-07 DIAGNOSIS — M19.031 ARTHRITIS OF RIGHT WRIST: Primary | ICD-10-CM

## 2021-05-07 DIAGNOSIS — Z98.890 HISTORY OF CARPAL TUNNEL SURGERY OF RIGHT WRIST: ICD-10-CM

## 2021-05-07 DIAGNOSIS — M19.031 ARTHRITIS OF RIGHT WRIST: ICD-10-CM

## 2021-05-07 PROCEDURE — 99213 PR OFFICE/OUTPT VISIT, EST, LEVL III, 20-29 MIN: ICD-10-PCS | Mod: 25,S$GLB,, | Performed by: PLASTIC SURGERY

## 2021-05-07 PROCEDURE — 20605 DRAIN/INJ JOINT/BURSA W/O US: CPT | Mod: PBBFAC | Performed by: PLASTIC SURGERY

## 2021-05-07 PROCEDURE — 99213 OFFICE O/P EST LOW 20 MIN: CPT | Mod: 25,S$GLB,, | Performed by: PLASTIC SURGERY

## 2021-05-07 PROCEDURE — 73110 X-RAY EXAM OF WRIST: CPT | Mod: 26,RT,, | Performed by: RADIOLOGY

## 2021-05-07 PROCEDURE — 73110 X-RAY EXAM OF WRIST: CPT | Mod: TC,FY,RT

## 2021-05-07 PROCEDURE — 99999 PR PBB SHADOW E&M-EST. PATIENT-LVL III: CPT | Mod: PBBFAC,,, | Performed by: PLASTIC SURGERY

## 2021-05-07 PROCEDURE — 99999 PR PBB SHADOW E&M-EST. PATIENT-LVL III: ICD-10-PCS | Mod: PBBFAC,,, | Performed by: PLASTIC SURGERY

## 2021-05-07 PROCEDURE — 73110 XR WRIST COMPLETE 3 VIEWS RIGHT: ICD-10-PCS | Mod: 26,RT,, | Performed by: RADIOLOGY

## 2021-05-07 PROCEDURE — 99213 OFFICE O/P EST LOW 20 MIN: CPT | Mod: PBBFAC,25 | Performed by: PLASTIC SURGERY

## 2021-05-07 PROCEDURE — 20605 DRAIN/INJ JOINT/BURSA W/O US: CPT | Mod: RT,S$GLB,, | Performed by: PLASTIC SURGERY

## 2021-05-07 PROCEDURE — 20605 PR DRAIN/INJECT INTERMEDIATE JOINT/BURSA: ICD-10-PCS | Mod: RT,S$GLB,, | Performed by: PLASTIC SURGERY

## 2021-05-07 RX ORDER — TRIAMCINOLONE ACETONIDE 40 MG/ML
40 INJECTION, SUSPENSION INTRA-ARTICULAR; INTRAMUSCULAR
Status: COMPLETED | OUTPATIENT
Start: 2021-05-07 | End: 2021-05-07

## 2021-05-07 RX ADMIN — TRIAMCINOLONE ACETONIDE 40 MG: 40 INJECTION, SUSPENSION INTRA-ARTICULAR; INTRAMUSCULAR at 11:05

## 2021-06-15 ENCOUNTER — DOCUMENTATION ONLY (OUTPATIENT)
Dept: NEUROLOGY | Facility: CLINIC | Age: 61
End: 2021-06-15
Payer: MEDICARE

## 2021-07-02 ENCOUNTER — OFFICE VISIT (OUTPATIENT)
Dept: SPINE | Facility: CLINIC | Age: 61
End: 2021-07-02
Payer: MEDICARE

## 2021-07-02 VITALS
HEART RATE: 88 BPM | SYSTOLIC BLOOD PRESSURE: 126 MMHG | HEIGHT: 62 IN | BODY MASS INDEX: 23 KG/M2 | DIASTOLIC BLOOD PRESSURE: 78 MMHG | WEIGHT: 125 LBS

## 2021-07-02 DIAGNOSIS — G89.29 CHRONIC BILATERAL LOW BACK PAIN WITH BILATERAL SCIATICA: ICD-10-CM

## 2021-07-02 DIAGNOSIS — M54.14 THORACIC AND LUMBOSACRAL NEURITIS: ICD-10-CM

## 2021-07-02 DIAGNOSIS — M54.12 RADICULOPATHY, CERVICAL REGION: ICD-10-CM

## 2021-07-02 DIAGNOSIS — M50.30 DEGENERATION OF CERVICAL INTERVERTEBRAL DISC: ICD-10-CM

## 2021-07-02 DIAGNOSIS — M54.42 CHRONIC BILATERAL LOW BACK PAIN WITH BILATERAL SCIATICA: ICD-10-CM

## 2021-07-02 DIAGNOSIS — M43.10 ACQUIRED SPONDYLOLISTHESIS: ICD-10-CM

## 2021-07-02 DIAGNOSIS — M51.37 DDD (DEGENERATIVE DISC DISEASE), LUMBOSACRAL: ICD-10-CM

## 2021-07-02 DIAGNOSIS — M48.02 CERVICAL STENOSIS OF SPINAL CANAL: ICD-10-CM

## 2021-07-02 DIAGNOSIS — M54.2 NECK PAIN: ICD-10-CM

## 2021-07-02 DIAGNOSIS — M54.41 CHRONIC BILATERAL LOW BACK PAIN WITH BILATERAL SCIATICA: ICD-10-CM

## 2021-07-02 DIAGNOSIS — M54.17 THORACIC AND LUMBOSACRAL NEURITIS: ICD-10-CM

## 2021-07-02 DIAGNOSIS — M47.812 CERVICAL SPONDYLOSIS WITHOUT MYELOPATHY: ICD-10-CM

## 2021-07-02 PROCEDURE — 99213 OFFICE O/P EST LOW 20 MIN: CPT | Mod: S$GLB,,, | Performed by: NURSE PRACTITIONER

## 2021-07-02 PROCEDURE — 1125F AMNT PAIN NOTED PAIN PRSNT: CPT | Mod: S$GLB,,, | Performed by: NURSE PRACTITIONER

## 2021-07-02 PROCEDURE — 99999 PR PBB SHADOW E&M-EST. PATIENT-LVL III: CPT | Mod: PBBFAC,,, | Performed by: NURSE PRACTITIONER

## 2021-07-02 PROCEDURE — 99213 PR OFFICE/OUTPT VISIT, EST, LEVL III, 20-29 MIN: ICD-10-PCS | Mod: S$GLB,,, | Performed by: NURSE PRACTITIONER

## 2021-07-02 PROCEDURE — 3008F BODY MASS INDEX DOCD: CPT | Mod: CPTII,S$GLB,, | Performed by: NURSE PRACTITIONER

## 2021-07-02 PROCEDURE — 99999 PR PBB SHADOW E&M-EST. PATIENT-LVL III: ICD-10-PCS | Mod: PBBFAC,,, | Performed by: NURSE PRACTITIONER

## 2021-07-02 PROCEDURE — 1125F PR PAIN SEVERITY QUANTIFIED, PAIN PRESENT: ICD-10-PCS | Mod: S$GLB,,, | Performed by: NURSE PRACTITIONER

## 2021-07-02 PROCEDURE — 3008F PR BODY MASS INDEX (BMI) DOCUMENTED: ICD-10-PCS | Mod: CPTII,S$GLB,, | Performed by: NURSE PRACTITIONER

## 2021-07-02 RX ORDER — GABAPENTIN 300 MG/1
900 CAPSULE ORAL 3 TIMES DAILY
Qty: 810 CAPSULE | Refills: 1 | Status: SHIPPED | OUTPATIENT
Start: 2021-07-02 | End: 2021-07-02

## 2021-07-02 RX ORDER — CYCLOBENZAPRINE HCL 10 MG
10 TABLET ORAL EVERY 12 HOURS
Qty: 180 TABLET | Refills: 0 | Status: SHIPPED | OUTPATIENT
Start: 2021-07-02 | End: 2021-07-02

## 2021-07-02 RX ORDER — GABAPENTIN 300 MG/1
900 CAPSULE ORAL 3 TIMES DAILY
Qty: 810 CAPSULE | Refills: 1 | Status: SHIPPED | OUTPATIENT
Start: 2021-07-02 | End: 2021-09-30

## 2021-07-02 RX ORDER — CYCLOBENZAPRINE HCL 10 MG
10 TABLET ORAL EVERY 12 HOURS
Qty: 180 TABLET | Refills: 0 | Status: SHIPPED | OUTPATIENT
Start: 2021-07-02 | End: 2021-09-30

## 2021-07-10 ENCOUNTER — HOSPITAL ENCOUNTER (OUTPATIENT)
Dept: RADIOLOGY | Facility: OTHER | Age: 61
Discharge: HOME | End: 2021-07-10
Attending: NURSE PRACTITIONER
Payer: MEDICARE

## 2021-07-10 DIAGNOSIS — M54.12 RADICULOPATHY, CERVICAL REGION: ICD-10-CM

## 2021-07-10 DIAGNOSIS — M47.812 CERVICAL SPONDYLOSIS WITHOUT MYELOPATHY: ICD-10-CM

## 2021-07-10 PROCEDURE — 72141 MRI NECK SPINE W/O DYE: CPT | Mod: 26,,, | Performed by: RADIOLOGY

## 2021-07-10 PROCEDURE — 72141 MRI CERVICAL SPINE WITHOUT CONTRAST: ICD-10-PCS | Mod: 26,,, | Performed by: RADIOLOGY

## 2021-07-10 PROCEDURE — 72141 MRI NECK SPINE W/O DYE: CPT | Mod: TC

## 2023-06-11 RX ORDER — QUETIAPINE FUMARATE 100 MG/1
TABLET, FILM COATED ORAL
Qty: 90 TABLET | OUTPATIENT
Start: 2023-06-11

## 2023-06-11 NOTE — TELEPHONE ENCOUNTER
Refill Decision Note   Alayna Justin  is requesting a refill authorization.  Brief Assessment and Rationale for Refill:  Quick Discontinue     Medication Therapy Plan:       Medication Reconciliation Completed: No   Comments:     No Care Gaps recommended.     Note composed:2:28 PM 06/11/2023

## 2023-12-04 RX ORDER — TIOTROPIUM BROMIDE 18 UG/1
CAPSULE ORAL; RESPIRATORY (INHALATION)
Qty: 30 CAPSULE | Refills: 9 | Status: SHIPPED | OUTPATIENT
Start: 2023-12-04

## 2023-12-04 NOTE — TELEPHONE ENCOUNTER
Refill Routing Note   Medication(s) are not appropriate for processing by [facility] for the following reason(s):      - NO PCP LISTED IN EPIC; ROUTING TO DR CLEARY AS LAST PRESCRIBING PROVIDER    [facility] action(s):  Route          Medication reconciliation completed: No     Appointments  past 12m or future 3m with PCP    Date Provider   Last Visit   Visit date not found Bisi Cleary MD   Next Visit   Visit date not found Bisi Cleary MD   ED visits in past 90 days: 0        Note composed:10:04 AM 12/04/2023

## (undated) DEVICE — GLOVE BIOGEL SKINSENSE PI 6.5

## (undated) DEVICE — DRESSING N ADH OIL EMUL 3X8

## (undated) DEVICE — GLASSES EYE PROTECTIVE

## (undated) DEVICE — BLADE SURG STAINLESS STEEL #15

## (undated) DEVICE — SUT VICRYL 2-0 8-18 CP-2

## (undated) DEVICE — SOL IRR NACL .9% 1000CC

## (undated) DEVICE — SEE MEDLINE ITEM 152523

## (undated) DEVICE — GAUZE SPONGE 4X4 12PLY

## (undated) DEVICE — APPLICATOR CHLORAPREP ORN 26ML

## (undated) DEVICE — UNDERGLOVE BIOGEL PI SZ 6.5 LF

## (undated) DEVICE — BLANKET HYPER ADULT 24X60IN

## (undated) DEVICE — GLOVE BIOGEL SKINSENSE PI 8.5

## (undated) DEVICE — SEE MEDLINE ITEM 153151

## (undated) DEVICE — DRESSING N ADH OIL EMUL 3X8 3S

## (undated) DEVICE — SEE MEDLINE ITEM 146372

## (undated) DEVICE — GLOVE BIOGEL SKINSENSE PI 7.5

## (undated) DEVICE — SYR 50CC LL

## (undated) DEVICE — GOWN B1 X-LG X-LONG

## (undated) DEVICE — SOL 9P NACL IRR PIC IL

## (undated) DEVICE — SPEARS EYE 10/PK

## (undated) DEVICE — SUT 4/0 18IN ETHILON BL P3

## (undated) DEVICE — TRAY FOLEY 16FR INFECTION CONT

## (undated) DEVICE — BUCKET PLASTER DISPOSABLE

## (undated) DEVICE — GLOVE BIOGEL SKINSENSE PI 6.0

## (undated) DEVICE — STAPLER SKIN ROTATING HEAD

## (undated) DEVICE — CANNULA NUCLEUS HYRDODISECTOR

## (undated) DEVICE — TOURNIQUET SB QC DP 34X4IN

## (undated) DEVICE — SEE MEDLINE ITEM 146308

## (undated) DEVICE — DRAPE SURG W/TWL 17 5/8X23

## (undated) DEVICE — DRAPE STERI U-SHAPED 47X51IN

## (undated) DEVICE — COVER BACK TABLE 72X21

## (undated) DEVICE — DRESSING EYE OVAL LF

## (undated) DEVICE — DRAPE STERI-DRAPE 1000 17X11IN

## (undated) DEVICE — DRESSING XEROFORM FOIL PK 1X8

## (undated) DEVICE — CASSETTE INFINITI

## (undated) DEVICE — SUT ETHILON 10/0 CS160-6

## (undated) DEVICE — SEE MEDLINE ITEM 146298

## (undated) DEVICE — Device

## (undated) DEVICE — BANDAGE DERMACEA STRETCH 4X1IN

## (undated) DEVICE — UNDERGLOVES BIOGEL PI SIZE 8

## (undated) DEVICE — STOCKINETTE TUBULAR 2PL 6 X 4

## (undated) DEVICE — PULSAVAC ZIMMER

## (undated) DEVICE — PAD UNDERPAD 30X30

## (undated) DEVICE — NDL HYPO REG 25G X 1 1/2

## (undated) DEVICE — CHLORAPREP W TINT 26ML APPL

## (undated) DEVICE — ELECTRODE NEEDLE 1IN

## (undated) DEVICE — SOL BETADINE 5%

## (undated) DEVICE — SYR SLIP TIP 1CC

## (undated) DEVICE — PAD CAST SPECIALIST STRL 6

## (undated) DEVICE — DRAPE INCISE IOBAN 2 23X17IN

## (undated) DEVICE — TOURNIQUET SB QC SP 18X4IN

## (undated) DEVICE — PAD ABD 8X10 STERILE

## (undated) DEVICE — GLOVE BIOGEL SKINSENSE PI 7.0

## (undated) DEVICE — PADDING CAST SPECIALIST 6X4YD

## (undated) DEVICE — ELECTRODE REM PLYHSV RETURN 9

## (undated) DEVICE — SEE MEDLINE ITEM 157110

## (undated) DEVICE — SYR B-D DISP CONTROL 10CC100/C

## (undated) DEVICE — BLADE SAW SAG 25.40MM X 1.27MM

## (undated) DEVICE — PAD CAST SPECIALIST STRL 4

## (undated) DEVICE — SHIELD EYE PLASTIC 3100G

## (undated) DEVICE — SPLINT FIBERGLASS PAD 4X15

## (undated) DEVICE — KIT TOTAL HIP OPTIVAC

## (undated) DEVICE — SUT VICRYL+ 1 CTX 18IN VIOL

## (undated) DEVICE — PROBE VITRECTOMY CENTURION 23G

## (undated) DEVICE — SPONGE COTTON TRAY 4X4IN

## (undated) DEVICE — NDL 18GA X1 1/2 REG BEVEL

## (undated) DEVICE — CANNULA IRR ANTERIOR 27GX4MM

## (undated) DEVICE — PACK UPPER EXTREMITY BAPTIST

## (undated) DEVICE — SEE MEDLINE ITEM 146271

## (undated) DEVICE — MASK FLYTE HOOD PEEL AWAY